# Patient Record
Sex: FEMALE | Race: WHITE | HISPANIC OR LATINO | Employment: OTHER | URBAN - METROPOLITAN AREA
[De-identification: names, ages, dates, MRNs, and addresses within clinical notes are randomized per-mention and may not be internally consistent; named-entity substitution may affect disease eponyms.]

---

## 2018-01-10 ENCOUNTER — OFFICE VISIT (OUTPATIENT)
Dept: MEDICAL GROUP | Age: 63
End: 2018-01-10
Payer: COMMERCIAL

## 2018-01-10 VITALS
OXYGEN SATURATION: 95 % | SYSTOLIC BLOOD PRESSURE: 110 MMHG | HEART RATE: 94 BPM | DIASTOLIC BLOOD PRESSURE: 60 MMHG | BODY MASS INDEX: 25.23 KG/M2 | WEIGHT: 157 LBS | TEMPERATURE: 98.2 F | HEIGHT: 66 IN

## 2018-01-10 DIAGNOSIS — E78.5 HYPERLIPIDEMIA, UNSPECIFIED HYPERLIPIDEMIA TYPE: ICD-10-CM

## 2018-01-10 DIAGNOSIS — I10 ESSENTIAL HYPERTENSION: ICD-10-CM

## 2018-01-10 DIAGNOSIS — E11.9 TYPE 2 DIABETES MELLITUS WITHOUT COMPLICATION, WITHOUT LONG-TERM CURRENT USE OF INSULIN (HCC): ICD-10-CM

## 2018-01-10 DIAGNOSIS — Z85.3 HX: BREAST CANCER: ICD-10-CM

## 2018-01-10 PROCEDURE — 99214 OFFICE O/P EST MOD 30 MIN: CPT | Performed by: FAMILY MEDICINE

## 2018-01-10 RX ORDER — CHLORAL HYDRATE 500 MG
1000 CAPSULE ORAL
COMMUNITY
End: 2020-07-05

## 2018-01-10 RX ORDER — LANCETS 30 GAUGE
EACH MISCELLANEOUS
Qty: 100 EACH | Refills: 3 | Status: CANCELLED | OUTPATIENT
Start: 2018-01-10

## 2018-01-10 RX ORDER — SIMVASTATIN 40 MG
40 TABLET ORAL
Qty: 135 TAB | Refills: 1 | Status: ON HOLD | OUTPATIENT
Start: 2018-01-10 | End: 2020-07-07

## 2018-01-10 RX ORDER — ENALAPRIL MALEATE 10 MG/1
10 TABLET ORAL DAILY
Qty: 90 TAB | Refills: 1 | Status: SHIPPED | OUTPATIENT
Start: 2018-01-10 | End: 2023-02-14 | Stop reason: SDUPTHER

## 2018-01-10 RX ORDER — M-VIT,TX,IRON,MINS/CALC/FOLIC 27MG-0.4MG
1 TABLET ORAL DAILY
COMMUNITY

## 2018-01-10 ASSESSMENT — PATIENT HEALTH QUESTIONNAIRE - PHQ9: CLINICAL INTERPRETATION OF PHQ2 SCORE: 0

## 2018-01-10 NOTE — PROGRESS NOTES
SUBJECTIVE:    Chief Complaint   Patient presents with   • Diabetes     Med refill       HPI:     Hilda Garcia is a 62 y.o. female with hx of breast cancer and diabetes mellitus type 2 here follow up and to re-establish care as she previously had a new provider due to insurance.   Diabetes- states she is overall up to date with her lab work. She has tolerated metformin 500 mg twice daily without significant issues.   She has been on asa therapy.   She will request prior labs.   She needs her medications refilled.     Hypertension- BP has been stable on enalapril 10 mg daily. Has tolerated well, no unusual cough symptoms.     Hyperlipidemia- stable on simvastatin 40 mg daily. No unusual muscle aches. Has tolerated medication well.     Hx of breast cancer, left mastectomy 2002. She has been overall stable.   Per patient breast imaging up to date, obtain records.       Diabetes Health Maintenance  On aspirin: 81 mg  Foot exam: per patient up to date. Obtain records.   Eye exam: per patient up to date. Obtain records.   Vaccinations:    Influenza:per patient, up to date, obtain records; Pneumonia: per patient, up to date, obtain records; Td/Adacel: 2015; Zostavax  2015  Educated about routine foot exam: recommend routine foot exams.   States her colonoscopy is up to date.         ROS:  Denies any recent fevers or chills. No nausea or vomiting. No diarrhea. No chest pains or shortness of breath. No lower extremity edema.    Current Outpatient Prescriptions on File Prior to Visit   Medication Sig Dispense Refill   • glucose blood (ACCU-CHEK SMARTVIEW) strip 1 Strip by Other route 1 time daily as needed. 50 Strip 3   • Lancets Misc Lancets order:AccuCheck ragini needles. Sig: use once daily and prn ssx high or low sugar. 90 Each 3   • aspirin EC (ECOTRIN) 81 MG TBEC Take 81 mg by mouth every day.       No current facility-administered medications on file prior to visit.        Allergies   Allergen Reactions   • Asa  "[Aspirin]      As child in [facility]- had bulging eyes. Tolerates asa 81 mg.        Patient Active Problem List    Diagnosis Date Noted   • Glenoid labrum tear 11/07/2011   • Tendonitis of shoulder 11/07/2011   • HX: breast cancer 10/03/2011   • Type II or unspecified type diabetes mellitus without mention of complication, not stated as uncontrolled 10/03/2011   • HTN (hypertension) 10/03/2011   • Hyperlipidemia 10/03/2011   • Preventative health care 10/03/2011       Past Medical History:   Diagnosis Date   • Asthma    • Breast cancer (CMS-HCC)     left   • Glenoid labrum tear 11/7/2011   • HTN (hypertension) 10/3/2011   • HX: breast cancer 10/3/2011    left, mastectomy   • Hyperlipidemia 10/3/2011   • Tendonitis of shoulder 11/7/2011   • Type II or unspecified type diabetes mellitus without mention of complication, not stated as uncontrolled 10/3/2011             OBJECTIVE:   /60   Pulse 94   Temp 36.8 °C (98.2 °F)   Ht 1.664 m (5' 5.5\")   Wt 71.2 kg (157 lb)   LMP 03/15/2005   SpO2 95%   BMI 25.73 kg/m²   General: Well-developed well-nourished female, no acute distress  Neck: supple, no lymphadenopathy- cervical or supraclavicular, no thyromegaly  Cardiovascular: regular rate and rhythm, no murmurs, gallops, rubs  Lungs: clear to auscultation bilaterally, no wheezes, crackles, or rhonchi  Abdomen: +bowel sounds, soft, nontender, nondistended, no rebound, no guarding, no hepatosplenomegaly  Extremities: no cyanosis, clubbing, edema  Skin: Warm and dry  Psych: appropriate mood and affect        ASSESSMENT/PLAN:    62 y.o.female with hx of left side breast cancer, diabetes mellitus type 2, hypertension and hyperlipidemia.       1. Type 2 diabetes mellitus without complication, without long-term current use of insulin (CMS-[facility]) - stable. Continue current medication. Monitor glucose. Diabetic diet, routine exercise.  metformin (GLUCOPHAGE) 500 MG Tab   2. Essential hypertension -controlled, continue " current medication.  enalapril (VASOTEC) 10 MG Tab   3. Hyperlipidemia, unspecified hyperlipidemia type - stable, continue current medication. Low cholesterol, high fiber diet.  simvastatin (ZOCOR) 40 MG Tab   4. HX: breast cancer - s/p left mastectomy.   Recommend routine imaging studies. Obtain prior records.           Return in about 3 months (around 4/10/2018).    This medical record contains text that has been entered with the assistance of computer voice recognition and dictation software.  Therefore, it may contain unintended errors in text, spelling, punctuation, or grammar.

## 2018-01-10 NOTE — LETTER
[facility]  Cherry Vaughn M.D.  [address]  [place] [address]  Fax: [phone]   Authorization for Release/Disclosure of   Protected Health Information   Name: HILDA PLEITEZ : 1955 SSN: [id number]   Address: [address] [place] [address]  [place] [address] Phone:    [phone] (home)    I authorize the entity listed below to release/disclose the PHI below to:   [facility]/Cherry Vaughn M.D. and Cherry Vaughn M.D.   Provider or Entity Name:  [facility]   Address   [facility]   Phone:      Fax:     Reason for request: continuity of care   Information to be released:    [  ] LAST COLONOSCOPY,  including any PATH REPORT and follow-up  [  ] LAST FIT/COLOGUARD RESULT [  ] LAST DEXA  [  ] LAST MAMMOGRAM  [  ] LAST PAP  [  ] LAST LABS [  ] RETINA EXAM REPORT  [  ] IMMUNIZATION RECORDS  [xxx  ] Release all info      [  ] Check here and initial the line next to each item to release ALL health information INCLUDING  _____ Care and treatment for drug and / or alcohol abuse  _____ HIV testing, infection status, or AIDS  _____ Genetic Testing    DATES OF SERVICE OR TIME PERIOD TO BE DISCLOSED: _____________  I understand and acknowledge that:  * This Authorization may be revoked at any time by you in writing, except if your health information has already been used or disclosed.  * Your health information that will be used or disclosed as a result of you signing this authorization could be re-disclosed by the recipient. If this occurs, your re-disclosed health information may no longer be protected by State or Federal laws.  * You may refuse to sign this Authorization. Your refusal will not affect your ability to obtain treatment.  * This Authorization becomes effective upon signing and will  on (date) __________.      If no date is indicated, this Authorization will  one (1) year from the signature date.    Name: Hilda Pleitez    Signature:   Date:     1/10/2018       PLEASE FAX  REQUESTED RECORDS BACK TO: [phone]

## 2018-01-10 NOTE — LETTER
[facility]  Cherry Vaughn M.D.  [address]  [place] [address]  Fax: [phone]   Authorization for Release/Disclosure of   Protected Health Information   Name: HILDA PLEITEZ : 1955 SSN: [id number]   Address: [address] [place] [address]  [place] [address] Phone:    [phone] (home)    I authorize the entity listed below to release/disclose the PHI below to:   [facility]/Cherry Vaughn M.D. and Cherry Vaughn M.D.   Provider or Entity Name:  Dr. Gaitan   Address   [facility]   Phone:      Fax:     Reason for request: continuity of care   Information to be released:    [  ] LAST COLONOSCOPY,  including any PATH REPORT and follow-up  [  ] LAST FIT/COLOGUARD RESULT [  ] LAST DEXA  [  ] LAST MAMMOGRAM  [  ] LAST PAP  [  ] LAST LABS [  xx] RETINA EXAM REPORT  [  ] IMMUNIZATION RECORDS  [  ] Release all info      [  ] Check here and initial the line next to each item to release ALL health information INCLUDING  _____ Care and treatment for drug and / or alcohol abuse  _____ HIV testing, infection status, or AIDS  _____ Genetic Testing    DATES OF SERVICE OR TIME PERIOD TO BE DISCLOSED: _____________  I understand and acknowledge that:  * This Authorization may be revoked at any time by you in writing, except if your health information has already been used or disclosed.  * Your health information that will be used or disclosed as a result of you signing this authorization could be re-disclosed by the recipient. If this occurs, your re-disclosed health information may no longer be protected by State or Federal laws.  * You may refuse to sign this Authorization. Your refusal will not affect your ability to obtain treatment.  * This Authorization becomes effective upon signing and will  on (date) __________.      If no date is indicated, this Authorization will  one (1) year from the signature date.    Name: Hilda Pleitez    Signature:   Date:     1/10/2018       PLEASE FAX REQUESTED  RECORDS BACK TO: [phone]

## 2018-07-31 DIAGNOSIS — E11.9 TYPE 2 DIABETES MELLITUS WITHOUT COMPLICATION, WITHOUT LONG-TERM CURRENT USE OF INSULIN (HCC): ICD-10-CM

## 2020-07-05 ENCOUNTER — HOSPITAL ENCOUNTER (OUTPATIENT)
Facility: MEDICAL CENTER | Age: 65
End: 2020-07-07
Attending: EMERGENCY MEDICINE | Admitting: HOSPITALIST
Payer: MEDICARE

## 2020-07-05 ENCOUNTER — APPOINTMENT (OUTPATIENT)
Dept: RADIOLOGY | Facility: MEDICAL CENTER | Age: 65
End: 2020-07-05
Attending: EMERGENCY MEDICINE
Payer: MEDICARE

## 2020-07-05 DIAGNOSIS — R07.9 CHEST PAIN, UNSPECIFIED TYPE: ICD-10-CM

## 2020-07-05 PROBLEM — E11.9 DIABETES MELLITUS TYPE 2 IN NONOBESE (HCC): Status: ACTIVE | Noted: 2020-07-05

## 2020-07-05 LAB
ALBUMIN SERPL BCP-MCNC: 4.4 G/DL (ref 3.2–4.9)
ALBUMIN/GLOB SERPL: 1.9 G/DL
ALP SERPL-CCNC: 66 U/L (ref 30–99)
ALT SERPL-CCNC: 15 U/L (ref 2–50)
ANION GAP SERPL CALC-SCNC: 11 MMOL/L (ref 7–16)
AST SERPL-CCNC: 20 U/L (ref 12–45)
BASOPHILS # BLD AUTO: 0.7 % (ref 0–1.8)
BASOPHILS # BLD: 0.03 K/UL (ref 0–0.12)
BILIRUB SERPL-MCNC: 0.4 MG/DL (ref 0.1–1.5)
BUN SERPL-MCNC: 12 MG/DL (ref 8–22)
CALCIUM SERPL-MCNC: 9 MG/DL (ref 8.4–10.2)
CHLORIDE SERPL-SCNC: 105 MMOL/L (ref 96–112)
CO2 SERPL-SCNC: 23 MMOL/L (ref 20–33)
CREAT SERPL-MCNC: 0.69 MG/DL (ref 0.5–1.4)
D DIMER PPP IA.FEU-MCNC: 0.6 UG/ML (FEU) (ref 0–0.5)
EKG IMPRESSION: NORMAL
EOSINOPHIL # BLD AUTO: 0.08 K/UL (ref 0–0.51)
EOSINOPHIL NFR BLD: 1.8 % (ref 0–6.9)
ERYTHROCYTE [DISTWIDTH] IN BLOOD BY AUTOMATED COUNT: 40.8 FL (ref 35.9–50)
GLOBULIN SER CALC-MCNC: 2.3 G/DL (ref 1.9–3.5)
GLUCOSE BLD-MCNC: 83 MG/DL (ref 65–99)
GLUCOSE SERPL-MCNC: 206 MG/DL (ref 65–99)
HCT VFR BLD AUTO: 45.2 % (ref 37–47)
HGB BLD-MCNC: 14.7 G/DL (ref 12–16)
IMM GRANULOCYTES # BLD AUTO: 0.01 K/UL (ref 0–0.11)
IMM GRANULOCYTES NFR BLD AUTO: 0.2 % (ref 0–0.9)
LYMPHOCYTES # BLD AUTO: 1.65 K/UL (ref 1–4.8)
LYMPHOCYTES NFR BLD: 37.9 % (ref 22–41)
MAGNESIUM SERPL-MCNC: 2.4 MG/DL (ref 1.5–2.5)
MCH RBC QN AUTO: 29.8 PG (ref 27–33)
MCHC RBC AUTO-ENTMCNC: 32.5 G/DL (ref 33.6–35)
MCV RBC AUTO: 91.7 FL (ref 81.4–97.8)
MONOCYTES # BLD AUTO: 0.28 K/UL (ref 0–0.85)
MONOCYTES NFR BLD AUTO: 6.4 % (ref 0–13.4)
NEUTROPHILS # BLD AUTO: 2.3 K/UL (ref 2–7.15)
NEUTROPHILS NFR BLD: 53 % (ref 44–72)
NRBC # BLD AUTO: 0 K/UL
NRBC BLD-RTO: 0 /100 WBC
PLATELET # BLD AUTO: 253 K/UL (ref 164–446)
PMV BLD AUTO: 8.6 FL (ref 9–12.9)
POTASSIUM SERPL-SCNC: 3.9 MMOL/L (ref 3.6–5.5)
PROT SERPL-MCNC: 6.7 G/DL (ref 6–8.2)
RBC # BLD AUTO: 4.93 M/UL (ref 4.2–5.4)
SODIUM SERPL-SCNC: 139 MMOL/L (ref 135–145)
TROPONIN T SERPL-MCNC: <6 NG/L (ref 6–19)
TROPONIN T SERPL-MCNC: <6 NG/L (ref 6–19)
TSH SERPL DL<=0.005 MIU/L-ACNC: 0.87 UIU/ML (ref 0.38–5.33)
WBC # BLD AUTO: 4.4 K/UL (ref 4.8–10.8)

## 2020-07-05 PROCEDURE — 94760 N-INVAS EAR/PLS OXIMETRY 1: CPT

## 2020-07-05 PROCEDURE — 83735 ASSAY OF MAGNESIUM: CPT

## 2020-07-05 PROCEDURE — 93005 ELECTROCARDIOGRAM TRACING: CPT

## 2020-07-05 PROCEDURE — 82962 GLUCOSE BLOOD TEST: CPT

## 2020-07-05 PROCEDURE — G0378 HOSPITAL OBSERVATION PER HR: HCPCS

## 2020-07-05 PROCEDURE — 99219 PR INITIAL OBSERVATION CARE,LEVL II: CPT | Mod: AI | Performed by: HOSPITALIST

## 2020-07-05 PROCEDURE — 93005 ELECTROCARDIOGRAM TRACING: CPT | Performed by: EMERGENCY MEDICINE

## 2020-07-05 PROCEDURE — A9270 NON-COVERED ITEM OR SERVICE: HCPCS | Performed by: HOSPITALIST

## 2020-07-05 PROCEDURE — 93017 CV STRESS TEST TRACING ONLY: CPT | Performed by: HOSPITALIST

## 2020-07-05 PROCEDURE — 99285 EMERGENCY DEPT VISIT HI MDM: CPT

## 2020-07-05 PROCEDURE — 700102 HCHG RX REV CODE 250 W/ 637 OVERRIDE(OP): Performed by: EMERGENCY MEDICINE

## 2020-07-05 PROCEDURE — 80053 COMPREHEN METABOLIC PANEL: CPT

## 2020-07-05 PROCEDURE — 84484 ASSAY OF TROPONIN QUANT: CPT

## 2020-07-05 PROCEDURE — 93005 ELECTROCARDIOGRAM TRACING: CPT | Mod: XE,77 | Performed by: HOSPITALIST

## 2020-07-05 PROCEDURE — 84443 ASSAY THYROID STIM HORMONE: CPT

## 2020-07-05 PROCEDURE — 83036 HEMOGLOBIN GLYCOSYLATED A1C: CPT

## 2020-07-05 PROCEDURE — 700102 HCHG RX REV CODE 250 W/ 637 OVERRIDE(OP): Performed by: HOSPITALIST

## 2020-07-05 PROCEDURE — 85025 COMPLETE CBC W/AUTO DIFF WBC: CPT

## 2020-07-05 PROCEDURE — 93010 ELECTROCARDIOGRAM REPORT: CPT | Performed by: INTERNAL MEDICINE

## 2020-07-05 PROCEDURE — 36415 COLL VENOUS BLD VENIPUNCTURE: CPT

## 2020-07-05 PROCEDURE — 85379 FIBRIN DEGRADATION QUANT: CPT

## 2020-07-05 PROCEDURE — A9270 NON-COVERED ITEM OR SERVICE: HCPCS | Performed by: EMERGENCY MEDICINE

## 2020-07-05 PROCEDURE — 71045 X-RAY EXAM CHEST 1 VIEW: CPT

## 2020-07-05 RX ORDER — UBIDECARENONE 75 MG
100 CAPSULE ORAL DAILY
Status: DISCONTINUED | OUTPATIENT
Start: 2020-07-06 | End: 2020-07-07 | Stop reason: HOSPADM

## 2020-07-05 RX ORDER — ACETAMINOPHEN 325 MG/1
650 TABLET ORAL EVERY 6 HOURS PRN
Status: DISCONTINUED | OUTPATIENT
Start: 2020-07-05 | End: 2020-07-07 | Stop reason: HOSPADM

## 2020-07-05 RX ORDER — ONDANSETRON 4 MG/1
4 TABLET, ORALLY DISINTEGRATING ORAL EVERY 4 HOURS PRN
Status: DISCONTINUED | OUTPATIENT
Start: 2020-07-05 | End: 2020-07-07 | Stop reason: HOSPADM

## 2020-07-05 RX ORDER — DEXTROSE MONOHYDRATE 25 G/50ML
50 INJECTION, SOLUTION INTRAVENOUS
Status: DISCONTINUED | OUTPATIENT
Start: 2020-07-05 | End: 2020-07-07 | Stop reason: HOSPADM

## 2020-07-05 RX ORDER — M-VIT,TX,IRON,MINS/CALC/FOLIC 27MG-0.4MG
1 TABLET ORAL DAILY
Status: DISCONTINUED | OUTPATIENT
Start: 2020-07-06 | End: 2020-07-07 | Stop reason: HOSPADM

## 2020-07-05 RX ORDER — POLYETHYLENE GLYCOL 3350 17 G/17G
1 POWDER, FOR SOLUTION ORAL
Status: DISCONTINUED | OUTPATIENT
Start: 2020-07-05 | End: 2020-07-07 | Stop reason: HOSPADM

## 2020-07-05 RX ORDER — ASPIRIN 81 MG/1
243 TABLET, CHEWABLE ORAL ONCE
Status: COMPLETED | OUTPATIENT
Start: 2020-07-05 | End: 2020-07-05

## 2020-07-05 RX ORDER — ASPIRIN 81 MG/1
162 TABLET, CHEWABLE ORAL DAILY
Status: DISCONTINUED | OUTPATIENT
Start: 2020-07-06 | End: 2020-07-06

## 2020-07-05 RX ORDER — ONDANSETRON 2 MG/ML
4 INJECTION INTRAMUSCULAR; INTRAVENOUS EVERY 4 HOURS PRN
Status: DISCONTINUED | OUTPATIENT
Start: 2020-07-05 | End: 2020-07-07 | Stop reason: HOSPADM

## 2020-07-05 RX ORDER — AMOXICILLIN 250 MG
2 CAPSULE ORAL 2 TIMES DAILY
Status: DISCONTINUED | OUTPATIENT
Start: 2020-07-05 | End: 2020-07-07 | Stop reason: HOSPADM

## 2020-07-05 RX ORDER — ENALAPRIL MALEATE 5 MG/1
10 TABLET ORAL DAILY
Status: DISCONTINUED | OUTPATIENT
Start: 2020-07-06 | End: 2020-07-07 | Stop reason: HOSPADM

## 2020-07-05 RX ORDER — ASPIRIN 600 MG/1
300 SUPPOSITORY RECTAL DAILY
Status: DISCONTINUED | OUTPATIENT
Start: 2020-07-05 | End: 2020-07-05

## 2020-07-05 RX ORDER — SIMVASTATIN 20 MG
40 TABLET ORAL
Status: DISCONTINUED | OUTPATIENT
Start: 2020-07-05 | End: 2020-07-06

## 2020-07-05 RX ORDER — BISACODYL 10 MG
10 SUPPOSITORY, RECTAL RECTAL
Status: DISCONTINUED | OUTPATIENT
Start: 2020-07-05 | End: 2020-07-07 | Stop reason: HOSPADM

## 2020-07-05 RX ADMIN — ASPIRIN 81 MG CHEWABLE TABLET 243 MG: 81 TABLET CHEWABLE at 09:53

## 2020-07-05 RX ADMIN — SIMVASTATIN 40 MG: 20 TABLET, FILM COATED ORAL at 20:00

## 2020-07-05 ASSESSMENT — COGNITIVE AND FUNCTIONAL STATUS - GENERAL
SUGGESTED CMS G CODE MODIFIER MOBILITY: CH
MOBILITY SCORE: 24
DAILY ACTIVITIY SCORE: 24
SUGGESTED CMS G CODE MODIFIER DAILY ACTIVITY: CH

## 2020-07-05 ASSESSMENT — ENCOUNTER SYMPTOMS
PALPITATIONS: 0
RESPIRATORY NEGATIVE: 1
LOSS OF CONSCIOUSNESS: 0
NAUSEA: 0
HEMOPTYSIS: 0
DOUBLE VISION: 0
VOMITING: 0
NERVOUS/ANXIOUS: 0
ABDOMINAL PAIN: 0
PSYCHIATRIC NEGATIVE: 1
FOCAL WEAKNESS: 0
EYES NEGATIVE: 1
GASTROINTESTINAL NEGATIVE: 1
SEIZURES: 0
MUSCULOSKELETAL NEGATIVE: 1
CONSTITUTIONAL NEGATIVE: 1
CONSTIPATION: 0
NEUROLOGICAL NEGATIVE: 1
DIAPHORESIS: 0
FEVER: 0
CHILLS: 0
DIZZINESS: 0
WHEEZING: 0
HEADACHES: 0
BRUISES/BLEEDS EASILY: 0
DIARRHEA: 0
COUGH: 0
HEARTBURN: 0
BLOOD IN STOOL: 0

## 2020-07-05 ASSESSMENT — COPD QUESTIONNAIRES
HAVE YOU SMOKED AT LEAST 100 CIGARETTES IN YOUR ENTIRE LIFE: NO/DON'T KNOW
DO YOU EVER COUGH UP ANY MUCUS OR PHLEGM?: NO/ONLY WITH OCCASIONAL COLDS OR INFECTIONS
DURING THE PAST 4 WEEKS HOW MUCH DID YOU FEEL SHORT OF BREATH: NONE/LITTLE OF THE TIME
COPD SCREENING SCORE: 2

## 2020-07-05 ASSESSMENT — LIFESTYLE VARIABLES
CONSUMPTION TOTAL: NEGATIVE
HOW MANY TIMES IN THE PAST YEAR HAVE YOU HAD 5 OR MORE DRINKS IN A DAY: 0
TOTAL SCORE: 0
HAVE YOU EVER FELT YOU SHOULD CUT DOWN ON YOUR DRINKING: NO
ALCOHOL_USE: NO
EVER_SMOKED: NEVER
EVER HAD A DRINK FIRST THING IN THE MORNING TO STEADY YOUR NERVES TO GET RID OF A HANGOVER: NO
HAVE PEOPLE ANNOYED YOU BY CRITICIZING YOUR DRINKING: NO
AVERAGE NUMBER OF DAYS PER WEEK YOU HAVE A DRINK CONTAINING ALCOHOL: 0
ON A TYPICAL DAY WHEN YOU DRINK ALCOHOL HOW MANY DRINKS DO YOU HAVE: 0
TOTAL SCORE: 0
TOTAL SCORE: 0
EVER FELT BAD OR GUILTY ABOUT YOUR DRINKING: NO

## 2020-07-05 ASSESSMENT — FIBROSIS 4 INDEX: FIB4 SCORE: 1.33

## 2020-07-05 ASSESSMENT — PATIENT HEALTH QUESTIONNAIRE - PHQ9
1. LITTLE INTEREST OR PLEASURE IN DOING THINGS: NOT AT ALL
2. FEELING DOWN, DEPRESSED, IRRITABLE, OR HOPELESS: NOT AT ALL
SUM OF ALL RESPONSES TO PHQ9 QUESTIONS 1 AND 2: 0

## 2020-07-05 NOTE — H&P
Hospital Medicine History & Physical Note    Date of Service  7/5/2020    Primary Care Physician  Pcp Pt States None    Code Status  Full Code    Chief Complaint  Chief Complaint   Patient presents with   • Chest Pain       History of Presenting Illness  65 y.o. female who presented 7/5/2020 with sudden onset of chest pain that came on about 8:00 this morning.  The patient was doing her online Scientologist.  The patient's Scientologist group then started singing and dancing so she jumped up and started singing and dancing.  Afterwards she developed chest pain that was is 8 out of 10 intensity was sharp and stabbing.  No associated nausea or vomiting no shortness of breath no diaphoresis.  This is the first time she is ever had a chest pain.  She has no family history of chest pain like this.  The patient at this point will be placed on aspirin admitted to the telemetry unit under telemetric monitoring will run serial cardiac markers followed by a exercise treadmill stress test.    Review of Systems  Review of Systems   Constitutional: Negative.  Negative for chills, diaphoresis and fever.   HENT: Negative.    Eyes: Negative.  Negative for double vision.   Respiratory: Negative.  Negative for cough, hemoptysis and wheezing.    Cardiovascular: Positive for chest pain. Negative for palpitations and leg swelling.   Gastrointestinal: Negative.  Negative for abdominal pain, blood in stool, constipation, diarrhea, heartburn, nausea and vomiting.   Genitourinary: Negative.  Negative for frequency, hematuria and urgency.   Musculoskeletal: Negative.  Negative for joint pain.   Skin: Negative.  Negative for itching and rash.   Neurological: Negative.  Negative for dizziness, focal weakness, seizures, loss of consciousness and headaches.   Endo/Heme/Allergies: Negative.  Does not bruise/bleed easily.   Psychiatric/Behavioral: Negative.  Negative for suicidal ideas. The patient is not nervous/anxious.    All other systems reviewed and are  negative.      Past Medical History   has a past medical history of Asthma, Breast cancer (CMS-HCC) (HCC), Glenoid labrum tear (11/7/2011), HTN (hypertension) (10/3/2011), breast cancer (10/3/2011), Hyperlipidemia (10/3/2011), Tendonitis of shoulder (11/7/2011), and Type II or unspecified type diabetes mellitus without mention of complication, not stated as uncontrolled (10/3/2011).    Surgical History   has a past surgical history that includes mastectomy modified radical; breast reconstruction; breast implant removal; dilation and curettage; and mastectomy (2002).     Family History  family history includes Arthritis in her daughter; Cancer in her father, maternal aunt, and mother; Lung Disease in her father; Stroke in her mother.     Social History   reports that she has never smoked. She has never used smokeless tobacco. She reports that she does not drink alcohol or use drugs.    Allergies  Allergies   Allergen Reactions   • Asa [Aspirin]      As child in [facility]- had bulging eyes. Tolerates asa 81 mg.        Medications  Prior to Admission Medications   Prescriptions Last Dose Informant Patient Reported? Taking?   Calcium Carb-Cholecalciferol (CALCIUM 600+D3 PO) 7/5/2020 at am  Yes No   Sig: Take 1 Tab by mouth every morning.   Cyanocobalamin (VITAMIN B-12 PO) 7/5/2020 at am  Yes Yes   Sig: Take 1 Tab by mouth every day.   aspirin EC (ECOTRIN) 81 MG TBEC 7/5/2020 at am  Yes No   Sig: Take 81 mg by mouth every bedtime.   enalapril (VASOTEC) 10 MG Tab 7/5/2020 at am  No No   Sig: Take 1 Tab by mouth every day.   simvastatin (ZOCOR) 40 MG Tab 7/4/2020 at hs  No No   Sig: Take 1 Tab by mouth every bedtime.   therapeutic multivitamin-minerals (THERAGRAN-M) Tab 7/5/2020 at am  Yes No   Sig: Take 1 Tab by mouth every day.      Facility-Administered Medications: None       Physical Exam  Temp:  [37 °C (98.6 °F)] 37 °C (98.6 °F)  Pulse:  [55-66] 57  Resp:  [18-19] 19  BP: (142-150)/(63-67) 144/63  SpO2:  [95 %-99  %] 97 %    Physical Exam  Vitals signs and nursing note reviewed. Exam conducted with a chaperone present.   Constitutional:       Appearance: Normal appearance. She is well-developed.   HENT:      Head: Normocephalic and atraumatic.      Right Ear: External ear normal.      Left Ear: External ear normal.      Nose: Nose normal.      Mouth/Throat:      Mouth: Mucous membranes are moist.      Pharynx: Oropharynx is clear.   Eyes:      Extraocular Movements: Extraocular movements intact.      Conjunctiva/sclera: Conjunctivae normal.      Pupils: Pupils are equal, round, and reactive to light.   Neck:      Musculoskeletal: Normal range of motion and neck supple.      Thyroid: No thyromegaly.      Vascular: No JVD.   Cardiovascular:      Rate and Rhythm: Normal rate and regular rhythm.      Pulses: Normal pulses.      Heart sounds: Normal heart sounds.   Pulmonary:      Effort: Pulmonary effort is normal.      Breath sounds: Normal breath sounds.   Chest:      Chest wall: No tenderness.   Abdominal:      General: Abdomen is flat. Bowel sounds are normal. There is no distension.      Palpations: Abdomen is soft. There is no mass.      Tenderness: There is no abdominal tenderness. There is no guarding or rebound.   Musculoskeletal: Normal range of motion.   Lymphadenopathy:      Cervical: No cervical adenopathy.   Skin:     General: Skin is warm and dry.      Capillary Refill: Capillary refill takes 2 to 3 seconds.      Findings: No rash.   Neurological:      General: No focal deficit present.      Mental Status: She is alert and oriented to person, place, and time. Mental status is at baseline.      Cranial Nerves: No cranial nerve deficit.      Deep Tendon Reflexes: Reflexes are normal and symmetric.   Psychiatric:         Mood and Affect: Mood normal.         Behavior: Behavior normal.         Thought Content: Thought content normal.         Judgment: Judgment normal.         Laboratory:  Recent Labs     07/05/20  0938    WBC 4.4*   RBC 4.93   HEMOGLOBIN 14.7   HEMATOCRIT 45.2   MCV 91.7   MCH 29.8   MCHC 32.5*   RDW 40.8   PLATELETCT 253   MPV 8.6*     Recent Labs     07/05/20  0938   SODIUM 139   POTASSIUM 3.9   CHLORIDE 105   CO2 23   GLUCOSE 206*   BUN 12   CREATININE 0.69   CALCIUM 9.0     Recent Labs     07/05/20  0938   ALTSGPT 15   ASTSGOT 20   ALKPHOSPHAT 66   TBILIRUBIN 0.4   GLUCOSE 206*         No results for input(s): NTPROBNP in the last 72 hours.      Recent Labs     07/05/20  0938   TROPONINT <6       Imaging:  DX-CHEST-PORTABLE (1 VIEW)   Final Result      No acute cardiac or pulmonary abnormality is noted.      Cardiac Stress Test Treadmill without Images    (Results Pending)         Assessment/Plan:    * Chest pain  Assessment & Plan  Patient has developed chest pain this morning while she was doing some dancing and singing through her Dweho.  The patient's chest pain was left-sided and was the entire left side it was sharp like somebody was stabbing her with a knife.  No associated nausea or vomiting no diaphoresis no shortness of breath.  The patient does have a history of diabetes and with her history of hypertension as well the patient at this point will be admitted to the telemetric unit.  Should be evaluated by serial cardiac markers and then we will do a exercise stress test on her.    Diabetes mellitus type 2 in nonobese (HCC)  Assessment & Plan  Diabetes mellitus type 2 that at this point is diet controlled.  -accus with sliding scale coverage while in the hospital  -diabetic diet continued  -diabetic education  -follow glycohemoglobin levels long term, check hemoglobin A1c level  -monitor for hypoglycemic episodes and adjust control if he should get low    HTN (hypertension)- (present on admission)  Assessment & Plan  Optimize hypertension management to keep systolic blood pressure less than 140 diastolic under 90.    HX: breast cancer- (present on admission)  Assessment & Plan  History of breast  cancer status post left mastectomy 9/11/2011.  Patient is currently not on tamoxifen as did not have receptors.  Patient did not need any radiation or chemo.  Patient is in remission with her breast cancer.

## 2020-07-05 NOTE — ASSESSMENT & PLAN NOTE
-Currently diet controlled.  Has been off metformin for approximately 2-3 years.  -Continue diabetic diet.  -A1c in process.  -Accu-Cheks ACHS with SSI as required while inpatient.

## 2020-07-05 NOTE — ASSESSMENT & PLAN NOTE
-Positive treadmill stress test.  Cardiology consulted.  Stress echocardiogram ordered.  -Start ASA and switch to atorvastatin.  -Troponins negative.  -Chest x-ray unremarkable.  -Age-adjusted d-dimer normal.

## 2020-07-05 NOTE — ED NOTES
Iv placed , labs drawn and taken to lab. Pt medicated as directed by ELYSE valera, poc update given to pt. Further orders and dispo pending at this time. No further questions from pt at this time

## 2020-07-05 NOTE — ED PROVIDER NOTES
ED Provider Note    CHIEF COMPLAINT  Chief Complaint   Patient presents with   • Chest Pain       HPI  Hilda Garcia is a 65 y.o. female who presents with chest pain atypical it occurred about an hour prior to presentation left-sided chest she also had radiation to the upper arm or into the jaw.  She is never had like this before.  It is atypical and sharp like an intermittent in the chest.  Aching in the jaw and arm.  She did notice the aching in the jaw prior day.  She also feels like it is clicks when she opens her mouth.  She has no shortness of breath no nausea no vomiting no diaphoresis no cough no sputum production no abdominal pain.  No numbness tingling or weakness.  Comes in for evaluation all other systems are negative    REVIEW OF SYSTEMS  See HPI for further details    PAST MEDICAL HISTORY  Past Medical History:   Diagnosis Date   • Asthma    • Breast cancer (CMS-HCC) (HCC)     left   • Glenoid labrum tear 11/7/2011   • HTN (hypertension) 10/3/2011   • HX: breast cancer 10/3/2011    left, mastectomy   • Hyperlipidemia 10/3/2011   • Tendonitis of shoulder 11/7/2011   • Type II or unspecified type diabetes mellitus without mention of complication, not stated as uncontrolled 10/3/2011       FAMILY HISTORY  Family History   Problem Relation Age of Onset   • Cancer Mother         pancreatic   • Stroke Mother    • Cancer Father         prostate cancer   • Lung Disease Father         emphysema   • Arthritis Daughter         Ank. Spond   • Cancer Maternal Aunt         breast cancer       SOCIAL HISTORY  Social History     Socioeconomic History   • Marital status:      Spouse name: Not on file   • Number of children: Not on file   • Years of education: Not on file   • Highest education level: Not on file   Occupational History   • Occupation: management in past     Employer: OTHER   Social Needs   • Financial resource strain: Not on file   • Food insecurity     Worry: Not on file     Inability: Not on  file   • Transportation needs     Medical: Not on file     Non-medical: Not on file   Tobacco Use   • Smoking status: Never Smoker   • Smokeless tobacco: Never Used   Substance and Sexual Activity   • Alcohol use: No     Comment: Rare   • Drug use: No   • Sexual activity: Yes     Partners: Male   Lifestyle   • Physical activity     Days per week: Not on file     Minutes per session: Not on file   • Stress: Not on file   Relationships   • Social connections     Talks on phone: Not on file     Gets together: Not on file     Attends [employer] service: Not on file     Active member of club or organization: Not on file     Attends meetings of clubs or organizations: Not on file     Relationship status: Not on file   • Intimate partner violence     Fear of current or ex partner: Not on file     Emotionally abused: Not on file     Physically abused: Not on file     Forced sexual activity: Not on file   Other Topics Concern   • Not on file   Social History Narrative     30+ years.     Born [facility].    Brother doctor in [place].     Sister.     Son is internist in [place].        SURGICAL HISTORY  Past Surgical History:   Procedure Laterality Date   • MASTECTOMY  2002   • BREAST IMPLANT REMOVAL     • BREAST RECONSTRUCTION     • DILATION AND CURETTAGE     • MASTECTOMY MODIFIED RADICAL      left       CURRENT MEDICATIONS  Home Medications     Reviewed by Marylou Pandey (Pharmacy Tech) on 07/05/20 at 1011  Med List Status: Complete   Medication Last Dose Status   aspirin EC (ECOTRIN) 81 MG TBEC 7/5/2020 Active   Calcium Carb-Cholecalciferol (CALCIUM 600+D3 PO) 7/5/2020 Active   Cyanocobalamin (VITAMIN B-12 PO) 7/5/2020 Active   enalapril (VASOTEC) 10 MG Tab 7/5/2020 Active   simvastatin (ZOCOR) 40 MG Tab 7/4/2020 Active   therapeutic multivitamin-minerals (THERAGRAN-M) Tab 7/5/2020 Active                ALLERGIES  Allergies   Allergen Reactions   • Asa [Aspirin]      As child in [facility]- had bulging eyes.  Tolerates asa 81 mg.        PHYSICAL EXAM  VITAL SIGNS: /67   Pulse 66   Temp 37 °C (98.6 °F)   Resp 18   Wt 66 kg (145 lb 8.1 oz)   LMP 03/15/2005   SpO2 99%   BMI 23.84 kg/m²     Constitutional: Patient is alert and oriented x3 in no distress   HENT: Moist mucous membranes  Eyes:   No conjunctivitis or icterus  Neck: Trachea is midline with  Lymphatic: No anterior cervical lymphadenopathy  Cardiovascular: Normal heart rate    Thorax & Lungs: Clear to auscultation  Back: No CVA tenderness  Abdomen: Nontender  Neurologic: Normal motor sensation  Extremities: No edema  Psychiatric: Affect normal, Judgment normal, Mood normal.     Results for orders placed or performed during the hospital encounter of 07/05/20   CBC WITH DIFFERENTIAL   Result Value Ref Range    WBC 4.4 (L) 4.8 - 10.8 K/uL    RBC 4.93 4.20 - 5.40 M/uL    Hemoglobin 14.7 12.0 - 16.0 g/dL    Hematocrit 45.2 37.0 - 47.0 %    MCV 91.7 81.4 - 97.8 fL    MCH 29.8 27.0 - 33.0 pg    MCHC 32.5 (L) 33.6 - 35.0 g/dL    RDW 40.8 35.9 - 50.0 fL    Platelet Count 253 164 - 446 K/uL    MPV 8.6 (L) 9.0 - 12.9 fL    Neutrophils-Polys 53.00 44.00 - 72.00 %    Lymphocytes 37.90 22.00 - 41.00 %    Monocytes 6.40 0.00 - 13.40 %    Eosinophils 1.80 0.00 - 6.90 %    Basophils 0.70 0.00 - 1.80 %    Immature Granulocytes 0.20 0.00 - 0.90 %    Nucleated RBC 0.00 /100 WBC    Neutrophils (Absolute) 2.30 2.00 - 7.15 K/uL    Lymphs (Absolute) 1.65 1.00 - 4.80 K/uL    Monos (Absolute) 0.28 0.00 - 0.85 K/uL    Eos (Absolute) 0.08 0.00 - 0.51 K/uL    Baso (Absolute) 0.03 0.00 - 0.12 K/uL    Immature Granulocytes (abs) 0.01 0.00 - 0.11 K/uL    NRBC (Absolute) 0.00 K/uL   COMP METABOLIC PANEL   Result Value Ref Range    Sodium 139 135 - 145 mmol/L    Potassium 3.9 3.6 - 5.5 mmol/L    Chloride 105 96 - 112 mmol/L    Co2 23 20 - 33 mmol/L    Anion Gap 11.0 7.0 - 16.0    Glucose 206 (H) 65 - 99 mg/dL    Bun 12 8 - 22 mg/dL    Creatinine 0.69 0.50 - 1.40 mg/dL    Calcium 9.0  8.4 - 10.2 mg/dL    AST(SGOT) 20 12 - 45 U/L    ALT(SGPT) 15 2 - 50 U/L    Alkaline Phosphatase 66 30 - 99 U/L    Total Bilirubin 0.4 0.1 - 1.5 mg/dL    Albumin 4.4 3.2 - 4.9 g/dL    Total Protein 6.7 6.0 - 8.2 g/dL    Globulin 2.3 1.9 - 3.5 g/dL    A-G Ratio 1.9 g/dL   TROPONIN   Result Value Ref Range    Troponin T <6 6 - 19 ng/L   ESTIMATED GFR   Result Value Ref Range    GFR If African American >60 >60 mL/min/1.73 m 2    GFR If Non African American >60 >60 mL/min/1.73 m 2   EKG   Result Value Ref Range    Report       [facility] Emergency Dept.    Test Date:  2020  Pt Name:    NELY PLEITEZ             Department: [facility]  MRN:        [id number]                      [facility]  Gender:     Female                       Technician: [id number]  :        1955                   Requested By:ER TRIAGE PROTOCOL  Order #:    [id number]                    Reading MD: JANIYA MUNIZ MD    Measurements  Intervals                                Axis  Rate:       65                           P:          69  TX:         183                          QRS:        47  QRSD:       94                           T:          39  QT:         404  QTc:        421    Interpretive Statements  Normal sinus rhythm with a rate of 65 normal corrected QT interval QRS and  axis.  Nonspecific T wave changes only are noted.  No old EKG for comparison  Electronically Signed On 2020 10:32:50 PDT by JANIYA MUNIZ MD       DX-CHEST-PORTABLE (1 VIEW)   Final Result      No acute cardiac or pulmonary abnormality is noted.              COURSE & MEDICAL DECISION MAKING  Pertinent Labs & Imaging studies reviewed. (See chart for details)  Patient's troponin is normal but she is early in her pain.  Is concerning for possible cardiac ischemia.  She has the faintest T wave changes in anterior V2 V4.  We did give her 3 chewable aspirins to get her up to a total of 325 today.  Due to all these factors the  patient is moderate risk for ischemic cardiac disease.  I will be contacting the hospitalist for admission    Troponin is negative d-dimer comes back at 0.6 but it age-adjusted d-dimer is 0.65 in our measurement she does not need a PE study this time.  I did inform her son who is an internist at [facility] of the patient's situation.  Contact the hospitalist for admission    FINAL IMPRESSION  1.   1. Chest pain, unspecified type         2.   3.         Electronically signed by: Kennedy Bryant M.D., 7/5/2020 10:24 AM

## 2020-07-05 NOTE — ASSESSMENT & PLAN NOTE
-History of breast cancer status post left mastectomy 9/11/2011.  -Not on tamoxifen, as did not have receptors.  -Did not need any radiation or chemo.

## 2020-07-06 ENCOUNTER — APPOINTMENT (OUTPATIENT)
Dept: RADIOLOGY | Facility: MEDICAL CENTER | Age: 65
End: 2020-07-06
Attending: HOSPITALIST
Payer: MEDICARE

## 2020-07-06 LAB
CHOLEST SERPL-MCNC: 225 MG/DL (ref 100–199)
COVID ORDER STATUS COVID19: NORMAL
EKG IMPRESSION: NORMAL
EST. AVERAGE GLUCOSE BLD GHB EST-MCNC: 128 MG/DL
GLUCOSE BLD-MCNC: 102 MG/DL (ref 65–99)
GLUCOSE BLD-MCNC: 113 MG/DL (ref 65–99)
GLUCOSE BLD-MCNC: 99 MG/DL (ref 65–99)
HBA1C MFR BLD: 6.1 % (ref 0–5.6)
HDLC SERPL-MCNC: 73 MG/DL
LDLC SERPL CALC-MCNC: 124 MG/DL
TRIGL SERPL-MCNC: 140 MG/DL (ref 0–149)
TROPONIN T SERPL-MCNC: 9 NG/L (ref 6–19)

## 2020-07-06 PROCEDURE — A9270 NON-COVERED ITEM OR SERVICE: HCPCS | Performed by: INTERNAL MEDICINE

## 2020-07-06 PROCEDURE — 84484 ASSAY OF TROPONIN QUANT: CPT

## 2020-07-06 PROCEDURE — 80061 LIPID PANEL: CPT

## 2020-07-06 PROCEDURE — 99225 PR SUBSEQUENT OBSERVATION CARE,LEVEL II: CPT | Performed by: NURSE PRACTITIONER

## 2020-07-06 PROCEDURE — 82962 GLUCOSE BLOOD TEST: CPT

## 2020-07-06 PROCEDURE — A9270 NON-COVERED ITEM OR SERVICE: HCPCS | Performed by: HOSPITALIST

## 2020-07-06 PROCEDURE — 99205 OFFICE O/P NEW HI 60 MIN: CPT | Performed by: INTERNAL MEDICINE

## 2020-07-06 PROCEDURE — 93018 CV STRESS TEST I&R ONLY: CPT | Performed by: INTERNAL MEDICINE

## 2020-07-06 PROCEDURE — 700102 HCHG RX REV CODE 250 W/ 637 OVERRIDE(OP): Performed by: INTERNAL MEDICINE

## 2020-07-06 PROCEDURE — G0378 HOSPITAL OBSERVATION PER HR: HCPCS

## 2020-07-06 PROCEDURE — A9270 NON-COVERED ITEM OR SERVICE: HCPCS | Performed by: NURSE PRACTITIONER

## 2020-07-06 PROCEDURE — 700102 HCHG RX REV CODE 250 W/ 637 OVERRIDE(OP): Performed by: NURSE PRACTITIONER

## 2020-07-06 PROCEDURE — U0003 INFECTIOUS AGENT DETECTION BY NUCLEIC ACID (DNA OR RNA); SEVERE ACUTE RESPIRATORY SYNDROME CORONAVIRUS 2 (SARS-COV-2) (CORONAVIRUS DISEASE [COVID-19]), AMPLIFIED PROBE TECHNIQUE, MAKING USE OF HIGH THROUGHPUT TECHNOLOGIES AS DESCRIBED BY CMS-2020-01-R: HCPCS

## 2020-07-06 PROCEDURE — C9803 HOPD COVID-19 SPEC COLLECT: HCPCS | Performed by: NURSE PRACTITIONER

## 2020-07-06 PROCEDURE — 700102 HCHG RX REV CODE 250 W/ 637 OVERRIDE(OP): Performed by: HOSPITALIST

## 2020-07-06 RX ORDER — ATORVASTATIN CALCIUM 40 MG/1
40 TABLET, FILM COATED ORAL EVERY EVENING
Status: DISCONTINUED | OUTPATIENT
Start: 2020-07-06 | End: 2020-07-07 | Stop reason: HOSPADM

## 2020-07-06 RX ADMIN — ASPIRIN 162 MG: 81 TABLET, COATED ORAL at 05:34

## 2020-07-06 RX ADMIN — ENALAPRIL MALEATE 10 MG: 5 TABLET ORAL at 05:34

## 2020-07-06 RX ADMIN — ATORVASTATIN CALCIUM 40 MG: 40 TABLET, FILM COATED ORAL at 17:18

## 2020-07-06 RX ADMIN — VITAM B12 100 MCG: 100 TAB at 05:34

## 2020-07-06 RX ADMIN — MULTIPLE VITAMINS W/ MINERALS TAB 1 TABLET: TAB at 05:34

## 2020-07-06 RX ADMIN — SENNOSIDES-DOCUSATE SODIUM TAB 8.6-50 MG 2 TABLET: 8.6-5 TAB at 17:14

## 2020-07-06 RX ADMIN — ASPIRIN 81 MG: 81 TABLET, COATED ORAL at 18:08

## 2020-07-06 ASSESSMENT — ENCOUNTER SYMPTOMS
CONSTIPATION: 0
DIARRHEA: 0
SENSORY CHANGE: 0
FEVER: 0
DIZZINESS: 0
HEADACHES: 0
INSOMNIA: 0
PALPITATIONS: 0
WEAKNESS: 0
NAUSEA: 0
SPEECH CHANGE: 0
COUGH: 0
FOCAL WEAKNESS: 0
DIAPHORESIS: 0
SHORTNESS OF BREATH: 0
ORTHOPNEA: 0
DEPRESSION: 0
VOMITING: 0
PND: 0
ABDOMINAL PAIN: 0
WHEEZING: 0
NERVOUS/ANXIOUS: 0
BRUISES/BLEEDS EASILY: 0

## 2020-07-06 NOTE — PROGRESS NOTES
[facility] Medicine Daily Progress Note    Date of Service  7/6/2020    Chief Complaint  Chest pain    Hospital Course   Ms. Garcia is a 65-year-old female who presented to the emergency department on 7/5/2020 with a sudden onset of chest pain that came on at approximately 8:00 that morning while she was at [employer] as she was singing, dancing, and jumping up and down.  She had no associated nausea or vomiting, shortness of breath, or diaphoresis.  She does have risk factors for CVD, including diabetes, hypertension, and hyperlipidemia.  Her EKG on arrival had no evidence of acute ischemia or infarct, troponins were negative x3, age-adjusted d-dimer was negative, and blood sugars were noted to be controlled.  Her lipid profile did show an elevated total cholesterol of 225 with an elevated LDL of 124.  She was started on aspirin therapy and admitted to the hospital for further testing.  A treadmill stress test was performed and was positive.  Dr. Mccann of cardiology was contacted.  While there is a high incidence of false positive treadmill stress testing in women, given her comorbidities Dr. Mccann would like to perform a stress echocardiogram, which has been ordered.       Interval Problem Update  Feeling good today, no further chest pain. No N/V, diaphoresis, or SOB.     MPI positive, cards consulted, stress echo ordered.    CBC mildly low at 4.4.  CBC otherwise unremarkable.  BMP also unremarkable.  Troponins negative x3.  Age-adjusted d-dimer is normal.  Blood profile showed elevated total cholesterol and LDL.    Afebrile overnight, HR 50s-60s, SBP 100s-teens, O2 saturations within the normal range on room air.    Consultants/Specialty  Cardiology-Dr. Mccann    Code Status  Full code    Disposition  Monitor overnight, stress echocardiogram in the morning.    Review of Systems  Review of Systems   Constitutional: Negative for diaphoresis, fever and malaise/fatigue.   Respiratory: Negative for cough, shortness  of breath and wheezing.    Cardiovascular: Negative for chest pain, palpitations, orthopnea, leg swelling and PND.   Gastrointestinal: Negative for abdominal pain, constipation, diarrhea, nausea and vomiting.   Genitourinary: Negative for dysuria, frequency and urgency.   Neurological: Negative for dizziness, sensory change, speech change, focal weakness, weakness and headaches.   Endo/Heme/Allergies: Does not bruise/bleed easily.   Psychiatric/Behavioral: Negative for depression. The patient is not nervous/anxious and does not have insomnia.    All other systems reviewed and are negative.     Physical Exam  Temp:  [36.3 °C (97.4 °F)-36.8 °C (98.2 °F)] 36.5 °C (97.7 °F)  Pulse:  [55-79] 74  Resp:  [18] 18  BP: (105-118)/(36-65) 117/65  SpO2:  [93 %-97 %] 94 %    Physical Exam  Vitals signs and nursing note reviewed.   Constitutional:       General: She is awake.      Appearance: Normal appearance. She is well-developed. She is not ill-appearing.   HENT:      Head: Normocephalic and atraumatic.      Mouth/Throat:      Lips: Pink.      Mouth: Mucous membranes are moist.   Eyes:      Conjunctiva/sclera: Conjunctivae normal.      Pupils: Pupils are equal, round, and reactive to light.   Neck:      Musculoskeletal: Normal range of motion and neck supple.      Vascular: No JVD.   Cardiovascular:      Rate and Rhythm: Normal rate and regular rhythm.      Pulses: Normal pulses.      Heart sounds: Normal heart sounds.   Pulmonary:      Effort: Pulmonary effort is normal.      Breath sounds: Normal breath sounds.   Abdominal:      General: There is no distension or abdominal bruit.      Palpations: Abdomen is soft.      Tenderness: There is no abdominal tenderness.   Musculoskeletal:      Right lower leg: No edema.      Left lower leg: No edema.   Skin:     General: Skin is warm and dry.   Neurological:      General: No focal deficit present.      Mental Status: She is alert and oriented to person, place, and time.      GCS:  GCS eye subscore is 4. GCS verbal subscore is 5. GCS motor subscore is 6.   Psychiatric:         Attention and Perception: Attention and perception normal.         Mood and Affect: Mood and affect normal.         Speech: Speech normal.         Behavior: Behavior normal. Behavior is cooperative.         Thought Content: Thought content normal.         Cognition and Memory: Cognition and memory normal.         Judgment: Judgment normal.     Fluids    Intake/Output Summary (Last 24 hours) at 7/6/2020 1739  Last data filed at 7/6/2020 1233  Gross per 24 hour   Intake 450 ml   Output no documentation   Net 450 ml     Laboratory  Recent Labs     07/05/20  0938   WBC 4.4*   RBC 4.93   HEMOGLOBIN 14.7   HEMATOCRIT 45.2   MCV 91.7   MCH 29.8   MCHC 32.5*   RDW 40.8   PLATELETCT 253   MPV 8.6*     Recent Labs     07/05/20  0938   SODIUM 139   POTASSIUM 3.9   CHLORIDE 105   CO2 23   GLUCOSE 206*   BUN 12   CREATININE 0.69   CALCIUM 9.0     Recent Labs     07/06/20  0728   TRIGLYCERIDE 140   HDL 73   *     Imaging  Cardiac Stress Test Treadmill without Images   Final Result      DX-CHEST-PORTABLE (1 VIEW)   Final Result      No acute cardiac or pulmonary abnormality is noted.      EC-ECHOCARDIOGRAM REST/STRESS W/O CONT    (Results Pending)      Assessment/Plan  * Chest pain- (present on admission)  Assessment & Plan  -Positive treadmill stress test.  Cardiology consulted.  Stress echocardiogram ordered.  -Start ASA and switch to atorvastatin.  -Troponins negative.  -Chest x-ray unremarkable.  -Age-adjusted d-dimer normal.    Diabetes mellitus type 2 in nonobese (HCC)- (present on admission)  Assessment & Plan  -Currently diet controlled.  Has been off metformin for approximately 2-3 years.  -Continue diabetic diet.  -A1c in process.  -Accu-Cheks ACHS with SSI as required while inpatient.    Hyperlipidemia- (present on admission)  Assessment & Plan  -Changed her home simvastatin to atorvastatin today.  -Recommend  rechecking lipid profile in approximately 3 months as outpatient.    HTN (hypertension)- (present on admission)  Assessment & Plan  -Well-controlled on her home enalapril which has been continued.    HX: breast cancer- (present on admission)  Assessment & Plan  -History of breast cancer status post left mastectomy 9/11/2011.  -Not on tamoxifen, as did not have receptors.  -Did not need any radiation or chemo.     VTE prophylaxis: SCDs and ambulation      Electronically signed by:  Delia Mcfadden, MSN, RN, APRN, ACNPC-AG, CCRN  Nurse Practitioner, [facility]  Work # [phone]    7/6/2020    5:39 PM

## 2020-07-06 NOTE — PROGRESS NOTES
Patient rounded. POC discussed. Cardiac Stress Test, Treadmill discussed. NPO status discussed. Medications administered as ordered. Comfortable in bed. Appropriate safety precautions in place. Bed alarm refused. Will continue to monitor patient.

## 2020-07-06 NOTE — PROGRESS NOTES
Patient pleasant sitting up eating lunch. Pending stress test results for discharge. Bed in low locked position, call bell within reach. Continue to monitor.

## 2020-07-06 NOTE — PROGRESS NOTES
Telemetry Strip     Strip printed: 1415  Measurements from am strip were as follows:  Rhythm:sr with bbb  HR:74  Measurements:.16/.10/.36        Telemetry Shift Summary:    Rhythm:sr with bbb  HR: 54-80  Ectopy:        Normal Values  Rhythm SR  HR Range    Measurements 0.12-0.20 / 0.06-0.10  / 0.30-0.52

## 2020-07-06 NOTE — PROGRESS NOTES
Patient educated re: treadmill stress test. Nursing goals identified: knowledge deficit, potential for anxiety r/t stress test, potential for compromised cardiac output. Care plan includes educating patient, reassurance and access to ACLS cart/team. Pt denies CP, denies valve disease, denies taking Beta blocker, confirmed on MAR. Patient prepped for treadmill stress test. Pt achieved 123% of MPHR, MAX HR = 163 METS = 7.3, total exercise time 6:00. Procedure ended due to fatigue and target HR achieved. See documentation. Returned to 301-1 via w/c with Joanna (transport).

## 2020-07-06 NOTE — PROGRESS NOTES
Pt arrived to 301-1 from ED. A&Ox4. VSS. Ambulated from [facility] to bed independently. Denies chest pain, dizziness, palpitations, SOB. Placed on tele. Oriented to room, call bell within reach. Admit profile completed. NPO after midnight for stress test in AM, no caffeine, no decaf, no chocolate. EKG completed as ordered. Fall precautions/safety maintained.

## 2020-07-06 NOTE — PROGRESS NOTES
Patient rounded. Patient sleeping. Comfortable in bed. Appropriate safety precautions in place. Bed alarm refused. Will continue to monitor patient.

## 2020-07-06 NOTE — PROGRESS NOTES
Bedside report given to LUDY Chowdhury. POC discussed, all questions answered. Safety precautions in place. Care released.

## 2020-07-06 NOTE — PROGRESS NOTES
2 RN skin check complete  Device in place PIV, tele .  Skin assessed under devices c/d/i .  Confirmed pressure ulcers found on NA .  New potential pressure ulcers found on NA . Wound consult placed NA .  The following interventions in place pt ambulates independently, repositions self frequently in bed, education provided, verbalized understanding .

## 2020-07-06 NOTE — PROGRESS NOTES
Telemetry Shift Summary    Rhythm SR  HR Range 60s-70s  Ectopy R-PVC  Measurements 0.16/0.10/0.38        Normal Values  Rhythm SR  HR Range    Measurements 0.12-0.20 / 0.06-0.10  / 0.30-0.52

## 2020-07-06 NOTE — CARE PLAN
Problem: Communication  Goal: The ability to communicate needs accurately and effectively will improve  Outcome: PROGRESSING AS EXPECTED  Note: Patient updated on the plan of care. Encouraged to voice feelings and to ask questions. Answered all questions and concerns. Agrees with the plan of care.      Problem: Safety  Goal: Will remain free from injury  Outcome: PROGRESSING AS EXPECTED  Note: Safety precautions in place. Bed alarm Refused. Will continue to monitor patient.   Goal: Will remain free from falls  Outcome: PROGRESSING AS EXPECTED  Note: Safety precautions in place. Bed alarm Refused. Will continue to monitor patient.      Problem: Infection  Goal: Will remain free from infection  Outcome: PROGRESSING AS EXPECTED  Note: Discussed the importance of infection prevention and hand hygiene.

## 2020-07-06 NOTE — CONSULTS
Cardiology Initial Consultation    Date of Service  7/6/2020    Referring Physician  VI Duff    Reason for Consultation  Chest pain and abnormal stress test    History of Presenting Illness  Hilda Garcia is a 65 y.o. female who presented 7/5/2020 with chest pain.  She had been face timing with her grandchildren.  She noted sharp stabbing pain in the left chest with radiating dull pain to her left shoulder blade and down her left side.  The stabbing chest pain only lasted 5 minutes, the dull aching chest pain lasted for over an hour.  She took an aspirin at home and was given more aspirin in the emergency room.  She never took nitroglycerin.    Presenting ECG with nonspecific T wave changes.  High-sensitivity troponin less than 6, less than 6, 9.  Was admitted the hospital underwent a treadmill stress test.  Had significant ST changes which were upsloping.  Noted to have drop in blood pressure during stress.    Has diabetes, previously on medications but controlled with diet and exercise alone the past 2 years  Has hyperlipidemia, LDL cholesterol 124 despite simvastatin.  Has hypertension, on medication, blood pressures been essentially normal here with the exception of slightly elevated in the emergency room.  Brother had MI with stent in his mid to late 60s.  No prior smoking history.  No history of autoimmune disease such as lupus or rheumatoid arthritis.  No chronic kidney disease.  No ETOH overuse.  No caffeine overuse.  Prior breast cancer but surgery only, no chemo or radiation, around 2001.    She walks 2 to 5 miles daily with her .  She is not limited by chest pain, pressure or tightness with activity.   No significant dyspnea on exertion, orthopnea or lower extremity swelling.   No significant palpitations, lightheadedness, or presyncope/syncope.   No symptoms of leg claudication.   No stroke/TIA like symptoms.    Her son is Ritesh Zenon Jose, a hospitalist at the [facility] and with the  rounding service at [facility].  She is accompanied by her .    Review of Systems  Review of Systems   All other systems reviewed and are negative.      Past Medical History   has a past medical history of Asthma, Breast cancer (HCC), Glenoid labrum tear (11/7/2011), HTN (hypertension) (10/3/2011), breast cancer (10/3/2011), Hyperlipidemia (10/3/2011), Tendonitis of shoulder (11/7/2011), and Type II or unspecified type diabetes mellitus without mention of complication, not stated as uncontrolled (10/3/2011).    Surgical History   has a past surgical history that includes mastectomy modified radical; breast reconstruction; breast implant removal; dilation and curettage; and mastectomy (2002).    Family History  family history includes Arthritis in her daughter; Cancer in her father, maternal aunt, and mother; Lung Disease in her father; Stroke in her mother.    Social History   reports that she has never smoked. She has never used smokeless tobacco. She reports that she does not drink alcohol or use drugs.    Medications  Prior to Admission Medications   Prescriptions Last Dose Informant Patient Reported? Taking?   Calcium Carb-Cholecalciferol (CALCIUM 600+D3 PO) 7/5/2020 at am  Yes No   Sig: Take 1 Tab by mouth every morning.   Cyanocobalamin (VITAMIN B-12 PO) 7/5/2020 at am  Yes Yes   Sig: Take 1 Tab by mouth every day.   aspirin EC (ECOTRIN) 81 MG TBEC 7/5/2020 at am  Yes No   Sig: Take 81 mg by mouth every bedtime.   enalapril (VASOTEC) 10 MG Tab 7/5/2020 at am  No No   Sig: Take 1 Tab by mouth every day.   simvastatin (ZOCOR) 40 MG Tab 7/4/2020 at hs  No No   Sig: Take 1 Tab by mouth every bedtime.   therapeutic multivitamin-minerals (THERAGRAN-M) Tab 7/5/2020 at am  Yes No   Sig: Take 1 Tab by mouth every day.      Facility-Administered Medications: None       Allergies  Allergies   Allergen Reactions   • Asa [Aspirin]      As child in [facility]- had bulging eyes. Tolerates asa 81 mg.        Vital signs in  last 24 hours  Temp:  [36.3 °C (97.4 °F)-36.8 °C (98.2 °F)] 36.3 °C (97.4 °F)  Pulse:  [55-79] 79  Resp:  [18-19] 18  BP: (105-131)/(36-61) 116/61  SpO2:  [93 %-97 %] 93 %    Physical Exam  Physical Exam     General: No acute distress. Well nourished.  HEENT: EOM grossly intact, no scleral icterus, no pharyngeal erythema.   Neck:  No JVD, no bruits, trachea midline  CVS: RRR. Normal S1, S2. No M/R/G. No LE edema.  2+ radial pulses, 2+ PT pulses  Resp: CTAB. No wheezing or crackles/rhonchi. Normal respiratory effort.  Abdomen: Soft, NT, no whitney hepatomegaly.  MSK/Ext: No clubbing or cyanosis.  Skin: Warm and dry, no rashes.  Neurological: CN III-XII grossly intact. No focal deficits.   Psych: A&O x 3, appropriate affect, good judgement      Lab Review  Lab Results   Component Value Date/Time    WBC 4.4 (L) 07/05/2020 09:38 AM    RBC 4.93 07/05/2020 09:38 AM    HEMOGLOBIN 14.7 07/05/2020 09:38 AM    HEMATOCRIT 45.2 07/05/2020 09:38 AM    MCV 91.7 07/05/2020 09:38 AM    MCH 29.8 07/05/2020 09:38 AM    MCHC 32.5 (L) 07/05/2020 09:38 AM    MPV 8.6 (L) 07/05/2020 09:38 AM      Lab Results   Component Value Date/Time    SODIUM 139 07/05/2020 09:38 AM    POTASSIUM 3.9 07/05/2020 09:38 AM    CHLORIDE 105 07/05/2020 09:38 AM    CO2 23 07/05/2020 09:38 AM    GLUCOSE 206 (H) 07/05/2020 09:38 AM    BUN 12 07/05/2020 09:38 AM    CREATININE 0.69 07/05/2020 09:38 AM      Lab Results   Component Value Date/Time    ASTSGOT 20 07/05/2020 09:38 AM    ALTSGPT 15 07/05/2020 09:38 AM     Lab Results   Component Value Date/Time    CHOLSTRLTOT 225 (H) 07/06/2020 07:28 AM     (H) 07/06/2020 07:28 AM    HDL 73 07/06/2020 07:28 AM    TRIGLYCERIDE 140 07/06/2020 07:28 AM    TROPONINT 9 07/06/2020 07:28 AM       No results for input(s): NTPROBNP in the last 72 hours.    Cardiac Imaging and Procedures Review  ECG 7/5/2020  Sinus, 65, nonspecific anterior T wave changes    EKG:  My personal interpretation of the EKG dated 7/5/2020 is  sinus, 59    Echocardiogram: None    Cardiac Catheterization: None    Imaging  Chest X-Ray: 7/6/2020  No acute cardiopulmonary process    Stress Test: 7/6/2020  Positive stress treadmill ECG for ischemia.   II, III, aVF 1 mm upsloping ST depression starting at stage 2 resolved    with recovery. V3-V6 0.5 mm Upsloping ST depression at stage 3 resolved    with recovery. aVR 1 mm ST elevation starting stage 3 of exercise resolved    with recovery.    Insufficient BP increase with prolonged exercise. >20mmHg drop from stage    1 to stage 3 of exercise.    No sustained arrhythmias.   [facility] treadmill score +1; intermediate risk.      Findings of this study communicated to Dr Glez.    Assessment/Plan  -Atypical chest pain with normal troponin  -Abnormal stress test, upsloping ST changes with drop in blood pressure but no symptoms  -Type 2 diabetes, diet only  -Mixed hyperlipidemia, not yet to goal  -Family history of coronary disease, brother    Plan:  -She felt well on the treadmill, however, drop in blood pressure was most concerning to me.  She agrees to repeat treadmill stress test with echo images tomorrow.  -No evidence of ACS given normal high-sensitivity troponin  -Risk factors for some coronary disease but clinically tolerates exercise well.  -If her stress echocardiogram is normal, she can be discharged home and we will arrange for short follow-up in our office to make sure she is back to regular exercise and feeling well.  -Continue primary prevention baby aspirin as an outpatient  -Change simvastatin 40 milligrams to Lipitor 40 mg to achieve LDL under 100  -I did discuss with him that a normal treadmill stress echo tomorrow implies that there is not 70% or more significant blockage in her artery but I do recommend continued medical therapy as if she has coronary disease including her aspirin and statin.  -I also discussed that a treadmill stress test does not mean she could not have acute coronary syndrome in  the future.  -I updated her son via phone who agrees with the plan.  All questions answered.    Discussed with VI Duff    Thank you for allowing me to participate in the care of this patient.    I will continue to follow this patient    Please contact me with any questions.    Shana Mccann M.D.   Cardiologist, [facility]  [phone]

## 2020-07-06 NOTE — PROGRESS NOTES
Received bedside patient report from LUDY Saldana. Patient resting comfortably in bed, no complaints at this time. Safety precautions in place. Will continue to monitor.

## 2020-07-06 NOTE — FLOWSHEET NOTE
07/05/20 1719   Vital Signs   Pulse 64   Respiration 18   Pulse Oximetry 95 %   Breath Sounds   RUL Breath Sounds Clear   RML Breath Sounds Clear   RLL Breath Sounds Diminished   CATHIE Breath Sounds Clear   LLL Breath Sounds Diminished   Oxygen   O2 (LPM) 0   O2 Delivery Device Room air w/o2 available

## 2020-07-07 ENCOUNTER — APPOINTMENT (OUTPATIENT)
Dept: CARDIOLOGY | Facility: MEDICAL CENTER | Age: 65
End: 2020-07-07
Attending: NURSE PRACTITIONER
Payer: MEDICARE

## 2020-07-07 VITALS
DIASTOLIC BLOOD PRESSURE: 46 MMHG | RESPIRATION RATE: 18 BRPM | HEIGHT: 66 IN | SYSTOLIC BLOOD PRESSURE: 100 MMHG | HEART RATE: 58 BPM | WEIGHT: 146.16 LBS | TEMPERATURE: 98 F | OXYGEN SATURATION: 96 % | BODY MASS INDEX: 23.49 KG/M2

## 2020-07-07 LAB
GLUCOSE BLD-MCNC: 100 MG/DL (ref 65–99)
GLUCOSE BLD-MCNC: 102 MG/DL (ref 65–99)
LV EJECT FRACT  99904: 65
SARS-COV-2 RNA RESP QL NAA+PROBE: NOTDETECTED
SPECIMEN SOURCE: NORMAL

## 2020-07-07 PROCEDURE — 93017 CV STRESS TEST TRACING ONLY: CPT

## 2020-07-07 PROCEDURE — 93350 STRESS TTE ONLY: CPT | Mod: 26 | Performed by: INTERNAL MEDICINE

## 2020-07-07 PROCEDURE — 99217 PR OBSERVATION CARE DISCHARGE: CPT | Performed by: HOSPITALIST

## 2020-07-07 PROCEDURE — G0378 HOSPITAL OBSERVATION PER HR: HCPCS

## 2020-07-07 PROCEDURE — A9270 NON-COVERED ITEM OR SERVICE: HCPCS | Performed by: INTERNAL MEDICINE

## 2020-07-07 PROCEDURE — 93018 CV STRESS TEST I&R ONLY: CPT | Performed by: INTERNAL MEDICINE

## 2020-07-07 PROCEDURE — A9270 NON-COVERED ITEM OR SERVICE: HCPCS | Performed by: HOSPITALIST

## 2020-07-07 PROCEDURE — 82962 GLUCOSE BLOOD TEST: CPT

## 2020-07-07 PROCEDURE — 700102 HCHG RX REV CODE 250 W/ 637 OVERRIDE(OP): Performed by: HOSPITALIST

## 2020-07-07 PROCEDURE — 700102 HCHG RX REV CODE 250 W/ 637 OVERRIDE(OP): Performed by: INTERNAL MEDICINE

## 2020-07-07 RX ORDER — ATORVASTATIN CALCIUM 40 MG/1
40 TABLET, FILM COATED ORAL EVERY EVENING
Qty: 30 TAB | Refills: 2 | Status: SHIPPED | OUTPATIENT
Start: 2020-07-07 | End: 2023-02-14

## 2020-07-07 RX ADMIN — CALCIUM CARBONATE-CHOLECALCIFEROL TAB 250 MG-125 UNIT 2 TABLET: 250-125 TAB at 05:39

## 2020-07-07 RX ADMIN — ENALAPRIL MALEATE 10 MG: 5 TABLET ORAL at 05:39

## 2020-07-07 RX ADMIN — MULTIPLE VITAMINS W/ MINERALS TAB 1 TABLET: TAB at 05:40

## 2020-07-07 RX ADMIN — VITAM B12 100 MCG: 100 TAB at 05:40

## 2020-07-07 RX ADMIN — ASPIRIN 81 MG: 81 TABLET, COATED ORAL at 05:39

## 2020-07-07 NOTE — ASSESSMENT & PLAN NOTE
-Changed her home simvastatin to atorvastatin today.  -Recommend rechecking lipid profile in approximately 3 months as outpatient.

## 2020-07-07 NOTE — PROGRESS NOTES
Telemetry Shift Summary     Rhythm SB/SR  HR Range 52 - 74  Ectopy R PAC  Measurements 0.20 / 0.08 / 0.42           Normal Values  Rhythm SR  HR Range    Measurements 0.12-0.20 / 0.06-0.10  / 0.30-0.52

## 2020-07-07 NOTE — PROGRESS NOTES
Received report from LUDY Chowdhury. Patient was awake at the time with no complaints. Patient's echo is scheduled for 1100 - pt educated on NPO status. Safety measures are in place.

## 2020-07-07 NOTE — CARE PLAN
Problem: Communication  Goal: The ability to communicate needs accurately and effectively will improve  Outcome: PROGRESSING AS EXPECTED   Patient updated on echo scheduled for this AM and NPO status. Patient verbalized understanding     Problem: Discharge Barriers/Planning  Goal: Patient's continuum of care needs will be met  Outcome: PROGRESSING AS EXPECTED   Patient educated that expected discharge is today, pending echo results. Patient verbalized understanding

## 2020-07-07 NOTE — DISCHARGE INSTRUCTIONS
Discharge Instructions    Discharged to home by car with relative. Discharged via wheelchair, hospital escort: Yes.  Special equipment needed: Not Applicable    Be sure to schedule a follow-up appointment with your primary care doctor or any specialists as instructed.     Discharge Plan:   Diet Plan: Discussed  Activity Level: Discussed  Confirmed Follow up Appointment: Patient to Call and Schedule Appointment  Confirmed Symptoms Management: Discussed  Medication Reconciliation Updated: Yes    I understand that a diet low in cholesterol, fat, and sodium is recommended for good health. Unless I have been given specific instructions below for another diet, I accept this instruction as my diet prescription.   Other diet: Cardiac/diabetic     Special Instructions: None    · Is patient discharged on Warfarin / Coumadin?   No     Chest Pain Observation  It is often hard to give a specific diagnosis for the cause of chest pain. Among other possibilities your symptoms might be caused by inadequate oxygen delivery to your heart (angina). Angina that is not treated or evaluated can lead to a heart attack (myocardial infarction) or death.  Blood tests, electrocardiograms, and X-rays may have been done to help determine a possible cause of your chest pain. After evaluation and observation, your health care provider has determined that it is unlikely your pain was caused by an unstable condition that requires hospitalization. However, a full evaluation of your pain may need to be completed, with additional diagnostic testing as directed. It is very important to keep your follow-up appointments. Not keeping your follow-up appointments could result in permanent heart damage, disability, or death. If there is any problem keeping your follow-up appointments, you must call your health care provider.  HOME CARE INSTRUCTIONS   Due to the slight chance that your pain could be angina, it is important to follow your health care provider's  treatment plan and also maintain a healthy lifestyle:  · Maintain or work toward achieving a healthy weight.  · Stay physically active and exercise regularly.  · Decrease your salt intake.  · Eat a balanced, healthy diet. Talk to a dietitian to learn about heart-healthy foods.  · Increase your fiber intake by including whole grains, vegetables, fruits, and nuts in your diet.  · Avoid situations that cause stress, anger, or depression.  · Take medicines as advised by your health care provider. Report any side effects to your health care provider. Do not stop medicines or adjust the dosages on your own.  · Quit smoking. Do not use nicotine patches or gum until you check with your health care provider.  · Keep your blood pressure, blood sugar, and cholesterol levels within normal limits.  · Limit alcohol intake to no more than 1 drink per day for women who are not pregnant and 2 drinks per day for men.  · Do not abuse drugs.  SEEK IMMEDIATE MEDICAL CARE IF:  You have severe chest pain or pressure which may include symptoms such as:  · You feel pain or pressure in your arms, neck, jaw, or back.  · You have severe back or abdominal pain, feel sick to your stomach (nauseous), or throw up (vomit).  · You are sweating profusely.  · You are having a fast or irregular heartbeat.  · You feel short of breath while at rest.  · You notice increasing shortness of breath during rest, sleep, or with activity.  · You have chest pain that does not get better after rest or after taking your usual medicine.  · You wake from sleep with chest pain.  · You are unable to sleep because you cannot breathe.  · You develop a frequent cough or you are coughing up blood.  · You feel dizzy, faint, or experience extreme fatigue.  · You develop severe weakness, dizziness, fainting, or chills.  Any of these symptoms may represent a serious problem that is an emergency. Do not wait to see if the symptoms will go away. Call your local emergency  services (911 in the [place] Do not drive yourself to the hospital.  MAKE SURE YOU:  · Understand these instructions.  · Will watch your condition.  · Will get help right away if you are not doing well or get worse.     This information is not intended to replace advice given to you by your health care provider. Make sure you discuss any questions you have with your health care provider.     Document Released: 01/20/2012 Document Revised: 12/23/2014 Document Reviewed: 06/19/2014  [employer] Interactive Patient Education ©2016 [employer].      Exercise Stress Echocardiogram, Care After  This sheet gives you information about how to care for yourself after your procedure. Your doctor may also give you more specific instructions. If you have problems or questions, call your doctor.  Follow these instructions at home:    · You may do these things as told by your doctor:  ? Eat what you normally eat.  ? Do your normal activities.  ? Take your normal medicines.  · Take over-the-counter and prescription medicines only as told by your doctor.  · Keep all follow-up visits as told by your doctor. This is important.  Contact a doctor if:  · You keep feeling dizzy or light-headed.  · You feel like your heart is beating fast.  · You keep feeling sick to your stomach (nauseous) or you throw up (vomit).  · You have a headache.  · You feel short of breath.  Get help right away if:  · You have pain or pressure in any of these areas:  ? Your chest.  ? Your jaw or neck.  ? Between your shoulder blades.  ? Down your left arm.  · You pass out (faint).  · You have trouble breathing.  Summary  · After your procedure, you may eat like normal, do your normal activities, and take your normal medicines as told by your doctor.  · Contact your doctor if you have dizziness, a fast heartbeat, or a headache.  · You should also contact your doctor if you feel sick to your stomach (nauseous), you throw up (vomit), or you feel short of breath.  · Get  help right away if you feel pain or pressure in any of these areas: your chest, jaw, neck, between your shoulder blades, or down your right arm.  · You should also get help right away if you pass out (faint) or have trouble breathing.  This information is not intended to replace advice given to you by your health care provider. Make sure you discuss any questions you have with your health care provider.  Document Released: 10/08/2014 Document Revised: 09/11/2017 Document Reviewed: 09/11/2017  [employer] Patient Education © 2020 Elsevier Inc.        Atorvastatin tablets  What is this medicine?  ATORVASTATIN (a TORE va sta tin) is known as a HMG-CoA reductase inhibitor or 'statin'. It lowers the level of cholesterol and triglycerides in the blood. This drug may also reduce the risk of heart attack, stroke, or other health problems in patients with risk factors for heart disease. Diet and lifestyle changes are often used with this drug.  This medicine may be used for other purposes; ask your health care provider or pharmacist if you have questions.  COMMON BRAND NAME(S): Lipitor  What should I tell my health care provider before I take this medicine?  They need to know if you have any of these conditions:  · diabetes  · if you often drink alcohol  · history of stroke  · kidney disease  · liver disease  · muscle aches or weakness  · thyroid disease  · an unusual or allergic reaction to atorvastatin, other medicines, foods, dyes, or preservatives  · pregnant or trying to get pregnant  · breast-feeding  How should I use this medicine?  Take this medicine by mouth with a glass of water. Follow the directions on the prescription label. You can take it with or without food. If it upsets your stomach, take it with food. Do not take with grapefruit juice. Take your medicine at regular intervals. Do not take it more often than directed. Do not stop taking except on your doctor's advice.  Talk to your pediatrician regarding the  use of this medicine in children. While this drug may be prescribed for children as young as 10 for selected conditions, precautions do apply.  Overdosage: If you think you have taken too much of this medicine contact a poison control center or emergency room at once.  NOTE: This medicine is only for you. Do not share this medicine with others.  What if I miss a dose?  If you miss a dose, take it as soon as you can. If your next dose is to be taken in less than 12 hours, then do not take the missed dose. Take the next dose at your regular time. Do not take double or extra doses.  What may interact with this medicine?  Do not take this medicine with any of the following medications:  · dasabuvir; ombitasvir; paritaprevir; ritonavir  · ombitasvir; paritaprevir; ritonavir  · posaconazole  · red yeast rice  This medicine may also interact with the following medications:  · alcohol  · birth control pills  · certain antibiotics like erythromycin and clarithromycin  · certain antivirals for HIV or hepatitis  · certain medicines for cholesterol like fenofibrate, gemfibrozil, and niacin  · certain medicines for fungal infections like ketoconazole and itraconazole  · colchicine  · cyclosporine  · digoxin  · grapefruit juice  · rifampin  This list may not describe all possible interactions. Give your health care provider a list of all the medicines, herbs, non-prescription drugs, or dietary supplements you use. Also tell them if you smoke, drink alcohol, or use illegal drugs. Some items may interact with your medicine.  What should I watch for while using this medicine?  Visit your doctor or health care professional for regular check-ups. You may need regular tests to make sure your liver is working properly.  Your health care professional may tell you to stop taking this medicine if you develop muscle problems. If your muscle problems do not go away after stopping this medicine, contact your health care professional.  Do not  become pregnant while taking this medicine. Women should inform their health care professional if they wish to become pregnant or think they might be pregnant. There is a potential for serious side effects to an unborn child. Talk to your health care professional or pharmacist for more information. Do not breast-feed an infant while taking this medicine.  This medicine may increase blood sugar. Ask your healthcare provider if changes in diet or medicines are needed if you have diabetes.  If you are going to need surgery or other procedure, tell your doctor that you are using this medicine.  This drug is only part of a total heart-health program. Your doctor or a dietician can suggest a low-cholesterol and low-fat diet to help. Avoid alcohol and smoking, and keep a proper exercise schedule.  This medicine may cause a decrease in Co-Enzyme Q-10. You should make sure that you get enough Co-Enzyme Q-10 while you are taking this medicine. Discuss the foods you eat and the vitamins you take with your health care professional.  What side effects may I notice from receiving this medicine?  Side effects that you should report to your doctor or health care professional as soon as possible:  · allergic reactions like skin rash, itching or hives, swelling of the face, lips, or tongue  · fever  · joint pain  · loss of memory  · redness, blistering, peeling or loosening of the skin, including inside the mouth  · signs and symptoms of high blood sugar such as being more thirsty or hungry or having to urinate more than normal. You may also feel very tired or have blurry vision.  · signs and symptoms of liver injury like dark yellow or brown urine; general ill feeling or flu-like symptoms; light-belly pain; unusually weak or tired; yellowing of the eyes or skin  · signs and symptoms of muscle injury like dark urine; trouble passing urine or change in the amount of urine; unusually weak or tired; muscle pain or side or back  pain  Side effects that usually do not require medical attention (report to your doctor or health care professional if they continue or are bothersome):  · diarrhea  · nausea  · stomach pain  · trouble sleeping  · upset stomach  This list may not describe all possible side effects. Call your doctor for medical advice about side effects. You may report side effects to FDA at 3-070-GCO-2608.  Where should I keep my medicine?  Keep out of the reach of children.  Store between 20 and 25 degrees C (68 and 77 degrees F). Throw away any unused medicine after the expiration date.  NOTE: This sheet is a summary. It may not cover all possible information. If you have questions about this medicine, talk to your doctor, pharmacist, or health care provider.  © 2020 Elsevier/Gold Standard (2019-10-09 11:36:16)      Depression / Suicide Risk    As you are discharged from this [facility] facility, it is important to learn how to keep safe from harming yourself.    Recognize the warning signs:  · Abrupt changes in personality, positive or negative- including increase in energy   · Giving away possessions  · Change in eating patterns- significant weight changes-  positive or negative  · Change in sleeping patterns- unable to sleep or sleeping all the time   · Unwillingness or inability to communicate  · Depression  · Unusual sadness, discouragement and loneliness  · Talk of wanting to die  · Neglect of personal appearance   · Rebelliousness- reckless behavior  · Withdrawal from people/activities they love  · Confusion- inability to concentrate     If you or a loved one observes any of these behaviors or has concerns about self-harm, here's what you can do:  · Talk about it- your feelings and reasons for harming yourself  · Remove any means that you might use to hurt yourself (examples: pills, rope, extension cords, firearm)  · Get professional help from the community (Mental Health, Substance Abuse, psychological counseling)  · Do  not be alone:Call your Safe Contact- someone whom you trust who will be there for you.  · Call your local CRISIS HOTLINE 303-5355 or 552-722-4316  · Call your local Children's Mobile Crisis Response Team [place] (661) 284-2915 or www.Hellotravel  · Call the toll free National Suicide Prevention Hotlines   · National Suicide Prevention Lifeline 377-406-VFHR (8987)  · National [place] Line Network 800-SUICIDE (674-8901)

## 2020-07-07 NOTE — PROGRESS NOTES
Received bedside patient report from LUDY Londono. Patient resting comfortably in bed, no complaints at this time. Safety precautions in place. Will continue to monitor.

## 2020-07-08 NOTE — DISCHARGE SUMMARY
Hospital Medicine Discharge Note     Admit Date:  7/5/2020       Discharge Date:   7/7/2020    Attending Physician:  Maik Jhaveri M.D.     Diagnoses (includes active and resolved):   Principal Problem:    Chest pain POA: Yes  Active Problems:    HX: breast cancer POA: Yes    HTN (hypertension) POA: Yes    Hyperlipidemia POA: Yes    Diabetes mellitus type 2 in nonobese (HCC) POA: Yes  Resolved Problems:    * No resolved hospital problems. *      Hospital Summary (Brief Narrative):       65 y.o. female who presented 7/5/2020 with sudden onset of chest pain.  The patient's [employer] group then started singing and dancing so she jumped up and started singing and dancing.  Afterwards she developed chest pain that was is 8 out of 10 intensity was sharp and stabbing.   Patient was admitted.  She was taken for a nuclear stress test and this was found to be abnormal.  Thus, cardiology was consulted.  Patient was then scheduled for a stress echo.  This was done and unremarkable.  The case was discussed with cardiology and the patient was able to be discharged home.  Her simvastatin was changed over to Lipitor in order to achieve a LDL under 100 as her goal.  She may also continue aspirin outpatient.  She may follow-up with cardiology outpatient.         Consultants:      Cardiologist Dr. Mccann    Procedures:        None    Discharge Medications:           Medication List      START taking these medications      Instructions   atorvastatin 40 MG Tabs  Commonly known as:  LIPITOR   Take 1 Tab by mouth every evening.  Dose:  40 mg        CONTINUE taking these medications      Instructions   aspirin EC 81 MG Tbec  Commonly known as:  ECOTRIN   Take 81 mg by mouth every bedtime.  Dose:  81 mg     CALCIUM 600+D3 PO   Take 1 Tab by mouth every morning.  Dose:  1 Tab     enalapril 10 MG Tabs  Commonly known as:  VASOTEC   Take 1 Tab by mouth every day.  Dose:  10 mg     therapeutic multivitamin-minerals Tabs   Take 1 Tab by mouth  every day.  Dose:  1 Tab     VITAMIN B-12 PO   Take 1 Tab by mouth every day.  Dose:  1 Tab        STOP taking these medications    simvastatin 40 MG Tabs  Commonly known as:  ZOCOR              Disposition:   Discharge home    Activity:   As tolerated    Code status:   Full code    Primary Care Provider:    Pcp Pt States None    Follow up appointment details :      Shana Mccann M.D.  [address] [place] [address] [address]  [place] [id number] [phone]    Schedule an appointment as soon as possible for a visit      Alanis Reece M.D.  [address] [facility] [address]  [place] [id number] [phone]    Schedule an appointment as soon as possible for a visit in 1 week      No future appointments.    Pending Studies:        None    #################################################    Interval history/exam for day of discharge:    Vitals:    07/06/20 1908 07/06/20 2305 07/07/20 0308 07/07/20 0705   BP: 104/65 101/53 (!) 99/44 100/46   Pulse: 68 (!) 57 (!) 51 (!) 58   Resp: 18 18 18 18   Temp: 36.4 °C (97.5 °F) 36.7 °C (98.1 °F) 36.4 °C (97.6 °F) 36.7 °C (98 °F)   TempSrc: Oral Oral Oral Oral   SpO2: 94% 91% 95% 96%   Weight:       Height:         Weight/BMI: Body mass index is 23.59 kg/m².  Pulse Oximetry: 96 %, O2 (LPM): 0, O2 Delivery Device: None - Room Air    General:  NAD  CVS:  RRR  PULM:  CTAB, no respiratory distress    Most Recent Labs:    Lab Results   Component Value Date/Time    WBC 4.4 (L) 07/05/2020 09:38 AM    RBC 4.93 07/05/2020 09:38 AM    HEMOGLOBIN 14.7 07/05/2020 09:38 AM    HEMATOCRIT 45.2 07/05/2020 09:38 AM    MCV 91.7 07/05/2020 09:38 AM    MCH 29.8 07/05/2020 09:38 AM    MCHC 32.5 (L) 07/05/2020 09:38 AM    MPV 8.6 (L) 07/05/2020 09:38 AM    NEUTSPOLYS 53.00 07/05/2020 09:38 AM    LYMPHOCYTES 37.90 07/05/2020 09:38 AM    MONOCYTES 6.40 07/05/2020 09:38 AM    EOSINOPHILS 1.80 07/05/2020 09:38 AM    BASOPHILS 0.70 07/05/2020 09:38 AM      Lab Results   Component Value Date/Time    SODIUM 139 07/05/2020  09:38 AM    POTASSIUM 3.9 07/05/2020 09:38 AM    CHLORIDE 105 07/05/2020 09:38 AM    CO2 23 07/05/2020 09:38 AM    GLUCOSE 206 (H) 07/05/2020 09:38 AM    BUN 12 07/05/2020 09:38 AM    CREATININE 0.69 07/05/2020 09:38 AM      Lab Results   Component Value Date/Time    ALTSGPT 15 07/05/2020 09:38 AM    ASTSGOT 20 07/05/2020 09:38 AM    ALKPHOSPHAT 66 07/05/2020 09:38 AM    TBILIRUBIN 0.4 07/05/2020 09:38 AM    ALBUMIN 4.4 07/05/2020 09:38 AM    GLOBULIN 2.3 07/05/2020 09:38 AM     No results found for: PROTHROMBTM, INR     Imaging/ Testing:      EC-ECHOCARDIOGRAM REST/STRESS W/O CONT   Final Result      Cardiac Stress Test Treadmill without Images   Final Result      DX-CHEST-PORTABLE (1 VIEW)   Final Result      No acute cardiac or pulmonary abnormality is noted.          Instructions:      The patient was instructed to return to the ER in the event of worsening symptoms. I have counseled the patient on the importance of compliance and the patient has agreed to proceed with all medical recommendations and follow up plan indicated above.   The patient understands that all medications come with benefits and risks. Risks may include permanent injury or death and these risks can be minimized with close reassessment and monitoring.

## 2020-07-08 NOTE — PROGRESS NOTES
Telemetry Shift Summary    Rhythm SR/SB  HR Range 50s-90s  Ectopy n/a  Measurements 0.18/0.10/0.38        Normal Values  Rhythm SR  HR Range    Measurements 0.12-0.20 / 0.06-0.10  / 0.30-0.52

## 2020-07-08 NOTE — PROGRESS NOTES
Patient discharged to home. Prescriptions sent to [facility] pharmacy. Patient verbalizes discharge instructions. IV and tele monitor removed. Patient off the floor via wheelchair with RN.

## 2020-07-14 ENCOUNTER — OFFICE VISIT (OUTPATIENT)
Dept: CARDIOLOGY | Facility: MEDICAL CENTER | Age: 65
End: 2020-07-14
Payer: MEDICARE

## 2020-07-14 VITALS
DIASTOLIC BLOOD PRESSURE: 72 MMHG | HEIGHT: 66 IN | HEART RATE: 82 BPM | OXYGEN SATURATION: 93 % | SYSTOLIC BLOOD PRESSURE: 120 MMHG | RESPIRATION RATE: 14 BRPM | BODY MASS INDEX: 23.33 KG/M2 | WEIGHT: 145.17 LBS

## 2020-07-14 DIAGNOSIS — E11.9 DM TYPE 2 WITHOUT RETINOPATHY (HCC): ICD-10-CM

## 2020-07-14 DIAGNOSIS — E11.9 DIABETES MELLITUS TYPE 2 IN NONOBESE (HCC): ICD-10-CM

## 2020-07-14 DIAGNOSIS — R94.39 ABNORMAL STRESS ECG WITH TREADMILL: ICD-10-CM

## 2020-07-14 DIAGNOSIS — I10 ESSENTIAL HYPERTENSION: ICD-10-CM

## 2020-07-14 DIAGNOSIS — E78.00 PURE HYPERCHOLESTEROLEMIA: ICD-10-CM

## 2020-07-14 DIAGNOSIS — R07.89 OTHER CHEST PAIN: ICD-10-CM

## 2020-07-14 PROCEDURE — 99214 OFFICE O/P EST MOD 30 MIN: CPT | Performed by: INTERNAL MEDICINE

## 2020-07-14 ASSESSMENT — FIBROSIS 4 INDEX: FIB4 SCORE: 1.33

## 2020-07-14 NOTE — LETTER
[facility]   [address]  [place] [address]  Phone: [phone]  Fax: [phone]              Hilda Garcia  1955    Encounter Date: 7/14/2020    Shana Mccann M.D.          PROGRESS NOTE:  Subjective:   Chief Complaint:   Chief Complaint   Patient presents with   • Chest Pain       Hilda Garcia is a 65 y.o. female who returns today after recent hospital admission for chest pain.    She presented 7/5/2020 with chest pain.  She had been face timing with her grandchildren.  She noted sharp stabbing pain in the left chest with radiating dull pain to her left shoulder blade and down her left side.  The stabbing chest pain only lasted 5 minutes, the dull aching chest pain lasted for over an hour.    She took an aspirin at home and was given more aspirin in the emergency room.    She never took nitroglycerin.     Presenting ECG with nonspecific T wave changes.  High-sensitivity troponin less than 6, less than 6, 9.  Was admitted the hospital underwent a treadmill stress test.  Had significant ST changes which were upsloping.  Noted to have drop in blood pressure during stress.  We repeated a treadmill stress echo, she exercised well, had normal blood pressure response to exercise and had normal pre-and post stress images and was discharged home.     Has diabetes, previously on medications but controlled with diet and exercise alone the past 2 years  Has hyperlipidemia, LDL cholesterol 124 despite simvastatin.  Has hypertension, on medication, blood pressures been essentially normal here with the exception of slightly elevated in the emergency room.  Brother had MI with stent in his mid to late 60s.  No prior smoking history.  No history of autoimmune disease such as lupus or rheumatoid arthritis.  No chronic kidney disease.  No ETOH overuse.  No caffeine overuse.  Prior breast cancer but surgery only, no chemo or radiation, around  2001.     She walks 2 to 5 miles daily with her .  She has not had CP. Did have shoulder pain, reproducible on exam.  She is not limited by chest pain, pressure or tightness with activity.   No significant dyspnea on exertion, orthopnea or lower extremity swelling.   No significant palpitations, lightheadedness, or presyncope/syncope.   No symptoms of leg claudication.   No stroke/TIA like symptoms.     Her son is Dr. Zenon Garcia, a [facility] and with the rounding service at [facility].  She is accompanied by her .    DATA REVIEWED by me:  ECG 7/5/2020  Sinus, 65, nonspecific anterior T wave changes     EKG:  My personal interpretation of the EKG dated 7/5/2020 is sinus, 59     Echocardiogram: None     Cardiac Catheterization: None     Imaging  Chest X-Ray: 7/6/2020  No acute cardiopulmonary process    Treadmill stress echocardiogram 7/7/2020  Negative stress echocardiogram for ischemia with adequate stress   achieved by heart rate criteria.   EF 70%  Resting blood pressure 140/76, peak blood pressure 182/66     Stress Test: 7/6/2020  Positive stress treadmill ECG for ischemia.   II, III, aVF 1 mm upsloping ST depression starting at stage 2 resolved    with recovery. V3-V6 0.5 mm Upsloping ST depression at stage 3 resolved    with recovery. aVR 1 mm ST elevation starting stage 3 of exercise resolved    with recovery.    Insufficient BP increase with prolonged exercise. >20mmHg drop from stage    1 to stage 3 of exercise.    No sustained arrhythmias.   [facility] treadmill score +1; intermediate risk.    Most recent labs:       Lab Results   Component Value Date/Time    HEMOGLOBIN 14.7 07/05/2020 09:38 AM    HEMATOCRIT 45.2 07/05/2020 09:38 AM    MCV 91.7 07/05/2020 09:38 AM      Lab Results   Component Value Date/Time    SODIUM 139 07/05/2020 09:38 AM    POTASSIUM 3.9 07/05/2020 09:38 AM    CHLORIDE 105 07/05/2020 09:38 AM    CO2 23 07/05/2020 09:38 AM    GLUCOSE 206 (H) 07/05/2020 09:38 AM    BUN  12 07/05/2020 09:38 AM    CREATININE 0.69 07/05/2020 09:38 AM      Lab Results   Component Value Date/Time    ASTSGOT 20 07/05/2020 09:38 AM    ALTSGPT 15 07/05/2020 09:38 AM    ALBUMIN 4.4 07/05/2020 09:38 AM      Lab Results   Component Value Date/Time    CHOLSTRLTOT 225 (H) 07/06/2020 07:28 AM     (H) 07/06/2020 07:28 AM    HDL 73 07/06/2020 07:28 AM    TRIGLYCERIDE 140 07/06/2020 07:28 AM           Past Medical History:   Diagnosis Date   • Asthma    • Breast cancer (HCC)     left   • Glenoid labrum tear 11/7/2011   • HTN (hypertension) 10/3/2011   • HX: breast cancer 10/3/2011    left, mastectomy   • Hyperlipidemia 10/3/2011   • Tendonitis of shoulder 11/7/2011   • Type II or unspecified type diabetes mellitus without mention of complication, not stated as uncontrolled 10/3/2011     Past Surgical History:   Procedure Laterality Date   • MASTECTOMY  2002   • BREAST IMPLANT REMOVAL     • BREAST RECONSTRUCTION     • DILATION AND CURETTAGE     • MASTECTOMY MODIFIED RADICAL      left     Family History   Problem Relation Age of Onset   • Cancer Mother         pancreatic   • Stroke Mother    • Cancer Father         prostate cancer   • Lung Disease Father         emphysema   • Arthritis Daughter         Ank. Spond   • Cancer Maternal Aunt         breast cancer     Social History     Socioeconomic History   • Marital status:      Spouse name: Not on file   • Number of children: Not on file   • Years of education: Not on file   • Highest education level: Not on file   Occupational History   • Occupation: management in past     Employer: OTHER   Social Needs   • Financial resource strain: Not on file   • Food insecurity     Worry: Not on file     Inability: Not on file   • Transportation needs     Medical: Not on file     Non-medical: Not on file   Tobacco Use   • Smoking status: Never Smoker   • Smokeless tobacco: Never Used   Substance and Sexual Activity   • Alcohol use: No     Comment: Rare   • Drug  "use: No   • Sexual activity: Yes     Partners: Male   Lifestyle   • Physical activity     Days per week: Not on file     Minutes per session: Not on file   • Stress: Not on file   Relationships   • Social connections     Talks on phone: Not on file     Gets together: Not on file     Attends Cheondo[employer] service: Not on file     Active member of club or organization: Not on file     Attends meetings of clubs or organizations: Not on file     Relationship status: Not on file   • Intimate partner violence     Fear of current or ex partner: Not on file     Emotionally abused: Not on file     Physically abused: Not on file     Forced sexual activity: Not on file   Other Topics Concern   • Not on file   Social History Narrative     30+ years.     Born [facility].    Brother doctor in [place].     Sister.     Son is internist in [place].      Allergies   Allergen Reactions   • Asa [Aspirin]      As child in [facility]- had bulging eyes. Tolerates asa 81 mg.        Current Outpatient Medications   Medication Sig Dispense Refill   • atorvastatin (LIPITOR) 40 MG Tab Take 1 Tab by mouth every evening. 30 Tab 2   • Cyanocobalamin (VITAMIN B-12 PO) Take 1 Tab by mouth every day.     • therapeutic multivitamin-minerals (THERAGRAN-M) Tab Take 1 Tab by mouth every day.     • Calcium Carb-Cholecalciferol (CALCIUM 600+D3 PO) Take 1 Tab by mouth every morning.     • enalapril (VASOTEC) 10 MG Tab Take 1 Tab by mouth every day. 90 Tab 1   • aspirin EC (ECOTRIN) 81 MG TBEC Take 81 mg by mouth every bedtime.       No current facility-administered medications for this visit.        ROS  All others systems reviewed and negative.     Objective:     /72 (BP Location: Right arm, Patient Position: Sitting, BP Cuff Size: Adult)   Pulse 82   Resp 14   Ht 1.676 m (5' 6\")   Wt 65.9 kg (145 lb 2.8 oz)   SpO2 93%  Body mass index is 23.43 kg/m².    General: No acute distress. Well nourished.  HEENT: EOM grossly intact, no scleral " icterus, no pharyngeal erythema.   Neck:  No JVD, no bruits, trachea midline  CVS: RRR. Normal S1, S2. No M/R/G. No LE edema.  2+ radial pulses, 2+ PT pulses  Resp: CTAB. No wheezing or crackles/rhonchi. Normal respiratory effort.  Abdomen: Soft, NT, no whitney hepatomegaly.  MSK/Ext: No clubbing or cyanosis.  Skin: Warm and dry, no rashes.  Neurological: CN III-XII grossly intact. No focal deficits.   Psych: A&O x 3, appropriate affect, good judgement      Assessment:     1. Other chest pain     2. Essential hypertension     3. DM type 2 without retinopathy (HCC)     4. Pure hypercholesterolemia     5. Diabetes mellitus type 2 in nonobese (HCC)     6. Abnormal stress ECG with treadmill         Medical Decision Making:  Today's Assessment / Status / Plan:     -Atypical chest pain with normal troponin  -Abnormal stress test followed by normal stress echo  -Type 2 diabetes, diet only  -Mixed hyperlipidemia, not yet to goal, statin just increased, LDL goal 100. FU  By PCP.  -Family history of coronary disease, brother  -Cont primary prevention statin, ASA.  -Cont exercise  -Her with her  who has a PM and sees Dr. Fitzgerald.  -RTC 1 year       Return in about 1 year (around 7/14/2021).    It is my pleasure to participate in the care of Ms. Garcia.  Please do not hesitate to contact me with questions or concerns.    Shana Mccann MD, [facility]  Cardiologist [facility]    Please note that this dictation was created using voice recognition software. I have made every reasonable attempt to correct obvious errors, but it is possible there are errors of grammar and possibly content that I did not discover before finalizing the note.      Alanis Reece M.D.  [address] [address]  VIA Facsimile: [phone]               no numbness, no tingling

## 2020-07-14 NOTE — PROGRESS NOTES
Subjective:   Chief Complaint:   Chief Complaint   Patient presents with   • Chest Pain       Hilda Garcia is a 65 y.o. female who returns today after recent hospital admission for chest pain.    She presented 7/5/2020 with chest pain.  She had been face timing with her grandchildren.  She noted sharp stabbing pain in the left chest with radiating dull pain to her left shoulder blade and down her left side.  The stabbing chest pain only lasted 5 minutes, the dull aching chest pain lasted for over an hour.    She took an aspirin at home and was given more aspirin in the emergency room.    She never took nitroglycerin.     Presenting ECG with nonspecific T wave changes.  High-sensitivity troponin less than 6, less than 6, 9.  Was admitted the hospital underwent a treadmill stress test.  Had significant ST changes which were upsloping.  Noted to have drop in blood pressure during stress.  We repeated a treadmill stress echo, she exercised well, had normal blood pressure response to exercise and had normal pre-and post stress images and was discharged home.     Has diabetes, previously on medications but controlled with diet and exercise alone the past 2 years  Has hyperlipidemia, LDL cholesterol 124 despite simvastatin.  Has hypertension, on medication, blood pressures been essentially normal here with the exception of slightly elevated in the emergency room.  Brother had MI with stent in his mid to late 60s.  No prior smoking history.  No history of autoimmune disease such as lupus or rheumatoid arthritis.  No chronic kidney disease.  No ETOH overuse.  No caffeine overuse.  Prior breast cancer but surgery only, no chemo or radiation, around 2001.     She walks 2 to 5 miles daily with her .  She has not had CP. Did have shoulder pain, reproducible on exam.  She is not limited by chest pain, pressure or tightness with activity.   No significant dyspnea on exertion, orthopnea or lower extremity swelling.   No  significant palpitations, lightheadedness, or presyncope/syncope.   No symptoms of leg claudication.   No stroke/TIA like symptoms.     Her son is Dr. Zenon Garcia, a [facility]ist at [facility] and with the rounding service at [facility].  She is accompanied by her .    DATA REVIEWED by me:  ECG 7/5/2020  Sinus, 65, nonspecific anterior T wave changes     EKG:  My personal interpretation of the EKG dated 7/5/2020 is sinus, 59     Echocardiogram: None     Cardiac Catheterization: None     Imaging  Chest X-Ray: 7/6/2020  No acute cardiopulmonary process    Treadmill stress echocardiogram 7/7/2020  Negative stress echocardiogram for ischemia with adequate stress   achieved by heart rate criteria.   EF 70%  Resting blood pressure 140/76, peak blood pressure 182/66     Stress Test: 7/6/2020  Positive stress treadmill ECG for ischemia.   II, III, aVF 1 mm upsloping ST depression starting at stage 2 resolved    with recovery. V3-V6 0.5 mm Upsloping ST depression at stage 3 resolved    with recovery. aVR 1 mm ST elevation starting stage 3 of exercise resolved    with recovery.    Insufficient BP increase with prolonged exercise. >20mmHg drop from stage    1 to stage 3 of exercise.    No sustained arrhythmias.   [facility] treadmill score +1; intermediate risk.    Most recent labs:       Lab Results   Component Value Date/Time    HEMOGLOBIN 14.7 07/05/2020 09:38 AM    HEMATOCRIT 45.2 07/05/2020 09:38 AM    MCV 91.7 07/05/2020 09:38 AM      Lab Results   Component Value Date/Time    SODIUM 139 07/05/2020 09:38 AM    POTASSIUM 3.9 07/05/2020 09:38 AM    CHLORIDE 105 07/05/2020 09:38 AM    CO2 23 07/05/2020 09:38 AM    GLUCOSE 206 (H) 07/05/2020 09:38 AM    BUN 12 07/05/2020 09:38 AM    CREATININE 0.69 07/05/2020 09:38 AM      Lab Results   Component Value Date/Time    ASTSGOT 20 07/05/2020 09:38 AM    ALTSGPT 15 07/05/2020 09:38 AM    ALBUMIN 4.4 07/05/2020 09:38 AM      Lab Results   Component Value Date/Time    CHOLSTRLTOT 225 (H)  07/06/2020 07:28 AM     (H) 07/06/2020 07:28 AM    HDL 73 07/06/2020 07:28 AM    TRIGLYCERIDE 140 07/06/2020 07:28 AM           Past Medical History:   Diagnosis Date   • Asthma    • Breast cancer (HCC)     left   • Glenoid labrum tear 11/7/2011   • HTN (hypertension) 10/3/2011   • HX: breast cancer 10/3/2011    left, mastectomy   • Hyperlipidemia 10/3/2011   • Tendonitis of shoulder 11/7/2011   • Type II or unspecified type diabetes mellitus without mention of complication, not stated as uncontrolled 10/3/2011     Past Surgical History:   Procedure Laterality Date   • MASTECTOMY  2002   • BREAST IMPLANT REMOVAL     • BREAST RECONSTRUCTION     • DILATION AND CURETTAGE     • MASTECTOMY MODIFIED RADICAL      left     Family History   Problem Relation Age of Onset   • Cancer Mother         pancreatic   • Stroke Mother    • Cancer Father         prostate cancer   • Lung Disease Father         emphysema   • Arthritis Daughter         Ank. Spond   • Cancer Maternal Aunt         breast cancer     Social History     Socioeconomic History   • Marital status:      Spouse name: Not on file   • Number of children: Not on file   • Years of education: Not on file   • Highest education level: Not on file   Occupational History   • Occupation: management in past     Employer: OTHER   Social Needs   • Financial resource strain: Not on file   • Food insecurity     Worry: Not on file     Inability: Not on file   • Transportation needs     Medical: Not on file     Non-medical: Not on file   Tobacco Use   • Smoking status: Never Smoker   • Smokeless tobacco: Never Used   Substance and Sexual Activity   • Alcohol use: No     Comment: Rare   • Drug use: No   • Sexual activity: Yes     Partners: Male   Lifestyle   • Physical activity     Days per week: Not on file     Minutes per session: Not on file   • Stress: Not on file   Relationships   • Social connections     Talks on phone: Not on file     Gets together: Not on file  "    Attends Jehovah's witness service: Not on file     Active member of club or organization: Not on file     Attends meetings of clubs or organizations: Not on file     Relationship status: Not on file   • Intimate partner violence     Fear of current or ex partner: Not on file     Emotionally abused: Not on file     Physically abused: Not on file     Forced sexual activity: Not on file   Other Topics Concern   • Not on file   Social History Narrative     30+ years.     Born [facility].    Brother doctor in [place].     Sister.     Son is internist in [place].      Allergies   Allergen Reactions   • Asa [Aspirin]      As child in [facility]- had bulging eyes. Tolerates asa 81 mg.        Current Outpatient Medications   Medication Sig Dispense Refill   • atorvastatin (LIPITOR) 40 MG Tab Take 1 Tab by mouth every evening. 30 Tab 2   • Cyanocobalamin (VITAMIN B-12 PO) Take 1 Tab by mouth every day.     • therapeutic multivitamin-minerals (THERAGRAN-M) Tab Take 1 Tab by mouth every day.     • Calcium Carb-Cholecalciferol (CALCIUM 600+D3 PO) Take 1 Tab by mouth every morning.     • enalapril (VASOTEC) 10 MG Tab Take 1 Tab by mouth every day. 90 Tab 1   • aspirin EC (ECOTRIN) 81 MG TBEC Take 81 mg by mouth every bedtime.       No current facility-administered medications for this visit.        ROS  All others systems reviewed and negative.     Objective:     /72 (BP Location: Right arm, Patient Position: Sitting, BP Cuff Size: Adult)   Pulse 82   Resp 14   Ht 1.676 m (5' 6\")   Wt 65.9 kg (145 lb 2.8 oz)   SpO2 93%  Body mass index is 23.43 kg/m².    General: No acute distress. Well nourished.  HEENT: EOM grossly intact, no scleral icterus, no pharyngeal erythema.   Neck:  No JVD, no bruits, trachea midline  CVS: RRR. Normal S1, S2. No M/R/G. No LE edema.  2+ radial pulses, 2+ PT pulses  Resp: CTAB. No wheezing or crackles/rhonchi. Normal respiratory effort.  Abdomen: Soft, NT, no whitney hepatomegaly.  MSK/Ext: No " clubbing or cyanosis.  Skin: Warm and dry, no rashes.  Neurological: CN III-XII grossly intact. No focal deficits.   Psych: A&O x 3, appropriate affect, good judgement      Assessment:     1. Other chest pain     2. Essential hypertension     3. DM type 2 without retinopathy (HCC)     4. Pure hypercholesterolemia     5. Diabetes mellitus type 2 in nonobese (HCC)     6. Abnormal stress ECG with treadmill         Medical Decision Making:  Today's Assessment / Status / Plan:     -Atypical chest pain with normal troponin  -Abnormal stress test followed by normal stress echo  -Type 2 diabetes, diet only  -Mixed hyperlipidemia, not yet to goal, statin just increased, LDL goal 100. FU  By PCP.  -Family history of coronary disease, brother  -Cont primary prevention statin, ASA.  -Cont exercise  -Her with her  who has a PM and sees Dr. Fitzgerald.  -RTC 1 year       Return in about 1 year (around 7/14/2021).    It is my pleasure to participate in the care of Ms. Garcia.  Please do not hesitate to contact me with questions or concerns.    Shana Mccann MD, [facility]  Cardiologist [facility]    Please note that this dictation was created using voice recognition software. I have made every reasonable attempt to correct obvious errors, but it is possible there are errors of grammar and possibly content that I did not discover before finalizing the note.

## 2020-12-05 ENCOUNTER — HOSPITAL ENCOUNTER (EMERGENCY)
Facility: MEDICAL CENTER | Age: 65
End: 2020-12-05
Attending: EMERGENCY MEDICINE
Payer: MEDICARE

## 2020-12-05 VITALS
HEIGHT: 66 IN | HEART RATE: 79 BPM | RESPIRATION RATE: 20 BRPM | BODY MASS INDEX: 23.74 KG/M2 | TEMPERATURE: 97.8 F | OXYGEN SATURATION: 97 % | WEIGHT: 147.71 LBS | SYSTOLIC BLOOD PRESSURE: 127 MMHG | DIASTOLIC BLOOD PRESSURE: 79 MMHG

## 2020-12-05 DIAGNOSIS — M79.602 PAIN OF LEFT UPPER EXTREMITY: ICD-10-CM

## 2020-12-05 PROCEDURE — 99281 EMR DPT VST MAYX REQ PHY/QHP: CPT

## 2020-12-05 RX ORDER — DOCUSATE SODIUM 100 MG/1
300 CAPSULE, LIQUID FILLED ORAL DAILY
COMMUNITY

## 2020-12-05 RX ORDER — ATORVASTATIN CALCIUM 80 MG/1
80 TABLET, FILM COATED ORAL NIGHTLY
Status: SHIPPED | COMMUNITY
End: 2023-02-14 | Stop reason: SDUPTHER

## 2020-12-05 ASSESSMENT — PAIN DESCRIPTION - DESCRIPTORS: DESCRIPTORS: ACHING

## 2020-12-05 ASSESSMENT — FIBROSIS 4 INDEX: FIB4 SCORE: 1.33

## 2020-12-05 NOTE — ED PROVIDER NOTES
ED Provider Note    CHIEF COMPLAINT  Chief Complaint   Patient presents with   • Arm Pain       HPI  Hilda Graham YANI Hui is a 65 y.o. female who presents with a past medical history is here for left breast mastectomy, breast cancer in remission, tendinitis, non-insulin-dependent diabetes, she presents with posterior neck, shoulder, and arm pain that began at 8 AM this morning.  It came on while she was talking on the phone.  It has resolved, the pain is gone.  She has never had pain like this before.  The pain was not exertional, she denied shortness of breath or diaphoresis.  She denies any weakness of the extremity, she denies any numbness of the extremity, she says the symptoms have resolved.    REVIEW OF SYSTEMS  See HPI for further details. All other systems are negative.     PAST MEDICAL HISTORY   has a past medical history of Asthma, Breast cancer (HCC), Glenoid labrum tear (11/7/2011), HTN (hypertension) (10/3/2011), breast cancer (10/3/2011), Hyperlipidemia (10/3/2011), Tendonitis of shoulder (11/7/2011), and Type II or unspecified type diabetes mellitus without mention of complication, not stated as uncontrolled (10/3/2011).    SOCIAL HISTORY  Social History     Tobacco Use   • Smoking status: Never Smoker   • Smokeless tobacco: Never Used   Substance and Sexual Activity   • Alcohol use: No     Comment: Rarely   • Drug use: No   • Sexual activity: Yes     Partners: Male       SURGICAL HISTORY   has a past surgical history that includes mastectomy modified radical; breast reconstruction; breast implant removal; dilation and curettage; and mastectomy (2002).    CURRENT MEDICATIONS  Home Medications    **Home medications have not yet been reviewed for this encounter**         ALLERGIES  Allergies   Allergen Reactions   • Asa [Aspirin]      As child in [facility]- had bulging eyes. Tolerates asa 81 mg.        PHYSICAL EXAM  VITAL SIGNS: /79   Pulse 79   Temp 36.6 °C (97.8 °F) (Temporal)   " Resp 20   Ht 1.676 m (5' 6\")   Wt 67 kg (147 lb 11.3 oz)   LMP 03/15/2005   SpO2 97%   BMI 23.84 kg/m²  @FLAVIO[[id number]::@   Pulse ox interpretation: I interpret this pulse ox as normal.  Constitutional: Alert in no apparent distress.  HENT: No signs of trauma, Bilateral external ears normal, Nose normal.   Eyes: Pupils are equal and reactive, Conjunctiva normal, Non-icteric.   Neck: Normal range of motion, No tenderness, Supple, No stridor.   Lymphatic: No lymphadenopathy noted.   Cardiovascular: Regular rate and rhythm, no murmurs.   Thorax & Lungs: Normal breath sounds, No respiratory distress, No wheezing, No chest tenderness.   Abdomen: Bowel sounds normal, Soft, No tenderness, No masses, No pulsatile masses. No peritoneal signs.  Skin: Warm, Dry, No erythema, No rash.   Back: No bony tenderness, No CVA tenderness.   Extremities: Intact distal pulses, No edema, No tenderness, No cyanosis.  Musculoskeletal: Good range of motion in all major joints.  The patient has tenderness over the left trapezius in the distribution of where she said the pain had occurred earlier.  Neurologic: Alert , Normal motor function, Normal sensory function, No focal deficits noted.  No evidence of spinal cord compression.  Psychiatric: Affect normal, Judgment normal, Mood normal.             COURSE & MEDICAL DECISION MAKING  Pertinent Labs & Imaging studies reviewed. (See chart for details)    The patient symptoms have resolved, she had no chest pain or shortness of breath.  She has reproducible musculoskeletal pain of the left trapezius area.  She has no evidence of spinal cord compression currently.  The patient's cause of her pain most likely is musculoskeletal.    I used full protective gear while I was in the patient's room including N 95 mask, goggles, gloves, gown. I limited my exposure time in the room. The brief amount of time I was in the room I was  by at least 6 feet except for a brief directed physical " examination. My equipment was wiped down, hands washed before and after the encounter.         At this time I would like the patient to use Aleve and Tylenol for her pain, to follow-up with her doctor if the symptoms do not resolve and to return to the ER if she has chest pain, diaphoresis, nausea, shortness of breath.       The patient will return for new or worsening symptoms and is stable at the time of discharge.    The patient is referred to a primary physician for blood pressure management, diabetic screening, and for all other preventative health concerns.        DISPOSITION:  Patient will be discharged home in stable condition.    FOLLOW UP:  [facility], Emergency Dept  [address]  [place] [id number] [phone]    If symptoms worsen    Alanis Reece M.D.  [address] [place] [id number] [phone]      call for follow up      OUTPATIENT MEDICATIONS:  New Prescriptions    No medications on file         The patient will return for worsening symptoms and is stable at the time of discharge. The patient verbalizes understanding and will comply.    FINAL IMPRESSION  1. Pain of left upper extremity                Electronically signed by: Brian Sheehan M.D., 12/5/2020 10:21 AM

## 2020-12-05 NOTE — ED TRIAGE NOTES
"Presents complaining of left arm pain recurring since earlier this AM. She denies any chest pain or dyspnea.  She does not describe any recent traumatic events.  Chief Complaint   Patient presents with   • Arm Pain     /79   Pulse 79   Temp 36.6 °C (97.8 °F) (Temporal)   Resp 20   Ht 1.676 m (5' 6\")   Wt 67 kg (147 lb 11.3 oz)   LMP 03/15/2005   SpO2 97%   BMI 23.84 kg/m²      "

## 2020-12-05 NOTE — DISCHARGE INSTRUCTIONS
Pain Without a Known Cause  Pain can occur in any part of the body and can range from mild to severe. Sometimes no cause can be found for why you are having pain. Some types of pain that can occur without a known cause include:  · Headache.  · Back pain.  · Abdominal pain.  · Neck pain.  Your health care provider will do tests to try to find the cause of your pain. If no cause is found, your health care provider may diagnose you with pain without a known cause. In some cases, your health care provider may repeat tests and look further for a possible cause.  Follow these instructions at home:  Managing pain, stiffness, and swelling         · Take over-the-counter and prescription medicines only as told by your health care provider.  · Do not drive or use heavy machinery while taking prescription pain medicine.  · Stop any activities that cause pain. Rest during periods of severe pain.  · If directed, put ice on the painful area:  ? Put ice in a plastic bag.  ? Place a towel between your skin and the bag.  ? Leave the ice on for 20 minutes, 2-3 times a day.  · If directed, apply heat to the affected area. Use the heat source that your health care provider recommends, such as a moist heat pack or a heating pad.  ? Place a towel between your skin and the heat source.  ? Leave the heat on for 20-30 minutes.  ? Remove the heat if your skin turns bright red. This is especially important if you are unable to feel pain, heat, or cold. You may have a greater risk of getting burned.  General instructions    · Reduce your stress with activities such as yoga or meditation. Talk with your health care provider about other ways to reduce stress.  · Exercise regularly. Ask your health care provider what activities are safe for you.  · Eat a balanced diet that includes fruits and vegetables, whole grains, lean meat, and low-fat dairy. Talk with your health care provider if you have any questions about your diet.  · If you are taking  prescription pain medicine, take actions to prevent or treat constipation. Your health care provider may recommend that you:  ? Drink enough fluid to keep your urine pale yellow.  ? Eat foods that are high in fiber, such as fresh fruits and vegetables, whole grains, and beans.  ? Limit foods that are high in fat and processed sugars, such as fried and sweet foods.  ? Take an over-the-counter or prescription medicine for constipation.  Contact a health care provider if you:  · Have pain, and no reason can be found for it.  · Do not get better, even after treatment.  Get help right away if:  · Your pain is making you want to harm yourself.  If you ever feel like you may hurt yourself or others, or have thoughts about taking your own life, get help right away. You can go to your nearest emergency department or call:  · Your local emergency services (911 in the [place]  · A suicide crisis helpline, such as the National Suicide Prevention Lifeline at 1-334.906.7313. This is open 24 hours a day.  Summary  · Pain can occur in any part of the body and can range from mild to severe.  · Your health care provider will do tests to try to find the cause of your pain. If no cause is found, your health care provider may diagnose you with pain without a known cause.  · To help your pain, take medicines as told by your health care provider, apply ice or heat, exercise, reduce stress, and eat a healthy diet.  This information is not intended to replace advice given to you by your health care provider. Make sure you discuss any questions you have with your health care provider.  Document Released: 09/12/2002 Document Revised: 02/13/2020 Document Reviewed: 01/07/2019  Elsevier Patient Education © 2020 [employer]

## 2020-12-05 NOTE — ED NOTES
Reviewed discharge instructions w/ pt, verbalized understanding to information provided including follow up care and return precautions, denied questions/concerns.  Pt ambulated from ED w/ family member.

## 2021-03-03 DIAGNOSIS — Z23 NEED FOR VACCINATION: ICD-10-CM

## 2021-03-11 ENCOUNTER — IMMUNIZATION (OUTPATIENT)
Dept: FAMILY PLANNING/WOMEN'S HEALTH CLINIC | Facility: IMMUNIZATION CENTER | Age: 66
End: 2021-03-11
Attending: INTERNAL MEDICINE
Payer: MEDICARE

## 2021-03-11 DIAGNOSIS — Z23 ENCOUNTER FOR VACCINATION: Primary | ICD-10-CM

## 2021-03-11 DIAGNOSIS — Z23 NEED FOR VACCINATION: ICD-10-CM

## 2021-03-11 PROCEDURE — 0001A PFIZER SARS-COV-2 VACCINE: CPT

## 2021-03-11 PROCEDURE — 91300 PFIZER SARS-COV-2 VACCINE: CPT

## 2021-04-15 ENCOUNTER — IMMUNIZATION (OUTPATIENT)
Dept: FAMILY PLANNING/WOMEN'S HEALTH CLINIC | Facility: IMMUNIZATION CENTER | Age: 66
End: 2021-04-15
Payer: MEDICARE

## 2021-04-15 DIAGNOSIS — Z23 ENCOUNTER FOR VACCINATION: Primary | ICD-10-CM

## 2021-04-15 PROCEDURE — 0002A PFIZER SARS-COV-2 VACCINE: CPT

## 2021-04-15 PROCEDURE — 91300 PFIZER SARS-COV-2 VACCINE: CPT

## 2021-12-14 ENCOUNTER — HOSPITAL ENCOUNTER (OUTPATIENT)
Dept: RADIOLOGY | Facility: MEDICAL CENTER | Age: 66
End: 2021-12-14
Attending: FAMILY MEDICINE
Payer: MEDICARE

## 2021-12-14 DIAGNOSIS — Z12.31 VISIT FOR SCREENING MAMMOGRAM: ICD-10-CM

## 2021-12-14 PROCEDURE — 77063 BREAST TOMOSYNTHESIS BI: CPT

## 2021-12-17 ENCOUNTER — HOSPITAL ENCOUNTER (OUTPATIENT)
Dept: RADIOLOGY | Facility: MEDICAL CENTER | Age: 66
End: 2021-12-17
Attending: FAMILY MEDICINE
Payer: MEDICARE

## 2022-01-11 ENCOUNTER — HOSPITAL ENCOUNTER (OUTPATIENT)
Dept: LAB | Facility: MEDICAL CENTER | Age: 67
End: 2022-01-11
Attending: FAMILY MEDICINE
Payer: MEDICARE

## 2022-01-11 LAB
ALBUMIN SERPL BCP-MCNC: 4.6 G/DL (ref 3.2–4.9)
ALBUMIN/GLOB SERPL: 1.8 G/DL
ALP SERPL-CCNC: 84 U/L (ref 30–99)
ALT SERPL-CCNC: 21 U/L (ref 2–50)
ANION GAP SERPL CALC-SCNC: 8 MMOL/L (ref 7–16)
AST SERPL-CCNC: 23 U/L (ref 12–45)
BILIRUB SERPL-MCNC: 0.6 MG/DL (ref 0.1–1.5)
BUN SERPL-MCNC: 13 MG/DL (ref 8–22)
CALCIUM SERPL-MCNC: 9.2 MG/DL (ref 8.5–10.5)
CHLORIDE SERPL-SCNC: 105 MMOL/L (ref 96–112)
CHOLEST SERPL-MCNC: 254 MG/DL (ref 100–199)
CO2 SERPL-SCNC: 26 MMOL/L (ref 20–33)
CREAT SERPL-MCNC: 0.64 MG/DL (ref 0.5–1.4)
CREAT UR-MCNC: 61.9 MG/DL
EST. AVERAGE GLUCOSE BLD GHB EST-MCNC: 131 MG/DL
FASTING STATUS PATIENT QL REPORTED: NORMAL
GLOBULIN SER CALC-MCNC: 2.6 G/DL (ref 1.9–3.5)
GLUCOSE SERPL-MCNC: 124 MG/DL (ref 65–99)
HBA1C MFR BLD: 6.2 % (ref 4–5.6)
HDLC SERPL-MCNC: 82 MG/DL
LDLC SERPL CALC-MCNC: 151 MG/DL
MICROALBUMIN UR-MCNC: <1.2 MG/DL
MICROALBUMIN/CREAT UR: NORMAL MG/G (ref 0–30)
POTASSIUM SERPL-SCNC: 4.4 MMOL/L (ref 3.6–5.5)
PROT SERPL-MCNC: 7.2 G/DL (ref 6–8.2)
SODIUM SERPL-SCNC: 139 MMOL/L (ref 135–145)
TRIGL SERPL-MCNC: 106 MG/DL (ref 0–149)

## 2022-01-11 PROCEDURE — 80061 LIPID PANEL: CPT

## 2022-01-11 PROCEDURE — 82043 UR ALBUMIN QUANTITATIVE: CPT

## 2022-01-11 PROCEDURE — 80053 COMPREHEN METABOLIC PANEL: CPT

## 2022-01-11 PROCEDURE — 82570 ASSAY OF URINE CREATININE: CPT

## 2022-01-11 PROCEDURE — 83036 HEMOGLOBIN GLYCOSYLATED A1C: CPT

## 2022-01-11 PROCEDURE — 36415 COLL VENOUS BLD VENIPUNCTURE: CPT

## 2022-06-07 ENCOUNTER — HOSPITAL ENCOUNTER (OUTPATIENT)
Dept: RADIOLOGY | Facility: MEDICAL CENTER | Age: 67
End: 2022-06-07
Attending: FAMILY MEDICINE
Payer: MEDICARE

## 2022-06-07 DIAGNOSIS — R05.9 COUGH: ICD-10-CM

## 2022-06-07 PROCEDURE — 71046 X-RAY EXAM CHEST 2 VIEWS: CPT

## 2022-10-21 ENCOUNTER — TELEPHONE (OUTPATIENT)
Dept: SCHEDULING | Facility: IMAGING CENTER | Age: 67
End: 2022-10-21

## 2022-11-07 ENCOUNTER — PATIENT MESSAGE (OUTPATIENT)
Dept: HEALTH INFORMATION MANAGEMENT | Facility: OTHER | Age: 67
End: 2022-11-07

## 2022-11-21 NOTE — PROGRESS NOTES
Telemetry Shift Summary     Rhythm SB/SR  HR Range 50 - 60  Ectopy R PVC  Measurements 0.20 / 0.08 / 0.42           Normal Values  Rhythm SR  HR Range    Measurements 0.12-0.20 / 0.06-0.10  / 0.30-0.52     Aklief Pregnancy And Lactation Text: It is unknown if this medication is safe to use during pregnancy.  It is unknown if this medication is excreted in breast milk.  Breastfeeding women should use the topical cream on the smallest area of the skin for the shortest time needed while breastfeeding.  Do not apply to nipple and areola.

## 2022-12-01 ENCOUNTER — TELEPHONE (OUTPATIENT)
Dept: SCHEDULING | Facility: IMAGING CENTER | Age: 67
End: 2022-12-01

## 2022-12-20 ENCOUNTER — APPOINTMENT (OUTPATIENT)
Dept: RADIOLOGY | Facility: MEDICAL CENTER | Age: 67
End: 2022-12-20
Attending: FAMILY MEDICINE
Payer: MEDICARE

## 2022-12-20 DIAGNOSIS — Z12.31 VISIT FOR SCREENING MAMMOGRAM: ICD-10-CM

## 2022-12-20 PROCEDURE — 77067 SCR MAMMO BI INCL CAD: CPT

## 2023-01-27 ENCOUNTER — APPOINTMENT (OUTPATIENT)
Dept: MEDICAL GROUP | Facility: MEDICAL CENTER | Age: 68
End: 2023-01-27
Payer: MEDICARE

## 2023-02-14 ENCOUNTER — OFFICE VISIT (OUTPATIENT)
Dept: MEDICAL GROUP | Facility: MEDICAL CENTER | Age: 68
End: 2023-02-14
Payer: MEDICARE

## 2023-02-14 VITALS
TEMPERATURE: 98 F | SYSTOLIC BLOOD PRESSURE: 122 MMHG | BODY MASS INDEX: 23.32 KG/M2 | HEIGHT: 65 IN | RESPIRATION RATE: 16 BRPM | WEIGHT: 140 LBS | HEART RATE: 79 BPM | DIASTOLIC BLOOD PRESSURE: 72 MMHG | OXYGEN SATURATION: 94 %

## 2023-02-14 DIAGNOSIS — Z11.59 NEED FOR HEPATITIS C SCREENING TEST: ICD-10-CM

## 2023-02-14 DIAGNOSIS — I10 ESSENTIAL HYPERTENSION: ICD-10-CM

## 2023-02-14 DIAGNOSIS — E78.00 PURE HYPERCHOLESTEROLEMIA: ICD-10-CM

## 2023-02-14 DIAGNOSIS — R06.83 SNORING: ICD-10-CM

## 2023-02-14 DIAGNOSIS — R73.03 PREDIABETES: ICD-10-CM

## 2023-02-14 DIAGNOSIS — Z85.3 HISTORY OF BREAST CANCER: ICD-10-CM

## 2023-02-14 PROBLEM — R07.9 CHEST PAIN: Status: RESOLVED | Noted: 2020-07-05 | Resolved: 2023-02-14

## 2023-02-14 PROCEDURE — 99204 OFFICE O/P NEW MOD 45 MIN: CPT | Performed by: INTERNAL MEDICINE

## 2023-02-14 RX ORDER — ENALAPRIL MALEATE 10 MG/1
10 TABLET ORAL DAILY
Qty: 90 TABLET | Refills: 3 | Status: SHIPPED | OUTPATIENT
Start: 2023-02-14 | End: 2024-01-05

## 2023-02-14 RX ORDER — COVID-19 MOLECULAR TEST ASSAY
KIT MISCELLANEOUS
COMMUNITY
Start: 2022-11-28 | End: 2023-02-14

## 2023-02-14 RX ORDER — ATORVASTATIN CALCIUM 80 MG/1
80 TABLET, FILM COATED ORAL NIGHTLY
Qty: 90 TABLET | Refills: 3 | Status: SHIPPED | OUTPATIENT
Start: 2023-02-14 | End: 2024-01-05

## 2023-02-14 RX ORDER — BLOOD SUGAR DIAGNOSTIC
STRIP MISCELLANEOUS
COMMUNITY
Start: 2022-12-11 | End: 2023-02-14 | Stop reason: SDUPTHER

## 2023-02-14 RX ORDER — BLOOD SUGAR DIAGNOSTIC
STRIP MISCELLANEOUS
Qty: 100 STRIP | Refills: 3 | Status: SHIPPED | OUTPATIENT
Start: 2023-02-14

## 2023-02-14 ASSESSMENT — PATIENT HEALTH QUESTIONNAIRE - PHQ9: CLINICAL INTERPRETATION OF PHQ2 SCORE: 0

## 2023-02-14 NOTE — PROGRESS NOTES
New Patient to Establish      CC: Review of chronic medical problems    HPI:   Hilda is a 67 y.o. female who came into clinic for above.    No acute health concerns.    She has been on enalapril for high blood pressure which is stable.    She takes atorvastatin 80 mg daily for high cholesterol.    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  Did not require radiation or Chemo. No Tamoxifen.     ROS:   As above    Patient Active Problem List    Diagnosis Date Noted    Prediabetes 07/05/2020    History of breast cancer 10/03/2011    Primary hypertension 10/03/2011    HTN (hypertension) 10/03/2011    Hyperlipidemia 10/03/2011       Past Medical History:   Diagnosis Date    Asthma     Breast cancer (HCC)     left    Glenoid labrum tear 11/7/2011    HTN (hypertension) 10/3/2011    HX: breast cancer 10/3/2011    left, mastectomy    Hyperlipidemia 10/3/2011    Tendonitis of shoulder 11/7/2011    Type II or unspecified type diabetes mellitus without mention of complication, not stated as uncontrolled 10/3/2011       Current Outpatient Medications   Medication Sig Dispense Refill    enalapril (VASOTEC) 10 MG Tab Take 1 Tablet by mouth every day. 90 Tablet 3    atorvastatin (LIPITOR) 80 MG tablet Take 1 Tablet by mouth every evening. 90 Tablet 3    ONETOUCH ULTRA strip USE TO TEST BLOOD SUGAR ONCE A  Strip 3    docusate sodium (COLACE) 100 MG Cap Take 300 mg by mouth every day.      Cyanocobalamin (VITAMIN B-12 PO) Take 1 Tab by mouth every day.      therapeutic multivitamin-minerals (THERAGRAN-M) Tab Take 1 Tab by mouth every day.      Calcium Carb-Cholecalciferol (CALCIUM 600+D3 PO) Take 1 Tab by mouth every morning.      aspirin EC (ECOTRIN) 81 MG TBEC Take 81 mg by mouth every bedtime.       No current facility-administered medications for this visit.       Allergies as of 02/14/2023 - Reviewed 02/14/2023   Allergen Reaction Noted    Asa [aspirin]  10/03/2011       Social History     Socioeconomic  "History    Marital status:      Spouse name: Not on file    Number of children: Not on file    Years of education: Not on file    Highest education level: Not on file   Occupational History    Occupation: management in past     Employer: OTHER   Tobacco Use    Smoking status: Never    Smokeless tobacco: Never   Vaping Use    Vaping Use: Never used   Substance and Sexual Activity    Alcohol use: No     Comment: Rarely    Drug use: No    Sexual activity: Yes     Partners: Male     Birth control/protection: None     Comment: , 2 children   Other Topics Concern    Not on file   Social History Narrative     30+ years.     [facility].    Brother doctor in [place].     Sister.     Son is internist in [place].      Social Determinants of Health     Financial Resource Strain: Not on file   Food Insecurity: Not on file   Transportation Needs: Not on file   Physical Activity: Not on file   Stress: Not on file   Social Connections: Not on file   Intimate Partner Violence: Not on file   Housing Stability: Not on file       Family History   Problem Relation Age of Onset    Cancer Mother         pancreatic    Stroke Mother     Cancer Father         prostate cancer    Lung Disease Father         emphysema    Arthritis Daughter         Ank. Spond    Cancer Maternal Aunt         breast cancer       Past Surgical History:   Procedure Laterality Date    CATARACT EXTRACTION WITH IOL Bilateral 2020    MASTECTOMY  01/01/2002    BREAST IMPLANT REMOVAL      BREAST RECONSTRUCTION      DILATION AND CURETTAGE      MASTECTOMY MODIFIED RADICAL      left         /72 (BP Location: Right arm, Patient Position: Sitting)   Pulse 79   Temp 36.7 °C (98 °F) (Temporal)   Resp 16   Ht 1.651 m (5' 5\")   Wt 63.5 kg (140 lb)   LMP 03/15/2005   SpO2 94%   BMI 23.30 kg/m²     Physical Exam  General: Alert and oriented, No apparent distress.  Eyes: Pupils are equal and reactive. No scleral icterus.  Throat: Clear no " erythema or exudates noted.  Mallampati is 4.  Neck: Supple. No cervical or supraclavicular lymphadenopathy noted. Thyroid not enlarged.  Lungs: Clear to auscultation bilaterally without any wheezing, crepitations.  Cardiovascular: Regular rate and rhythm. No murmurs, rubs or gallops.  Abdomen: Bowel sound +, soft, non tender, no rebound or guarding, no palpable organomegaly  Extremities: No edema.      Assessment and Plan    1. Essential hypertension  Stable.  Continue enalapril  - enalapril (VASOTEC) 10 MG Tab; Take 1 Tablet by mouth every day.  Dispense: 90 Tablet; Refill: 3  - Comp Metabolic Panel; Future  - TSH WITH REFLEX TO FT4; Future    2. Pure hypercholesterolemia  Tolerating atorvastatin, last lab record not available. Target LDL .  - atorvastatin (LIPITOR) 80 MG tablet; Take 1 Tablet by mouth every evening.  Dispense: 90 Tablet; Refill: 3  - Lipid Profile; Future    3. Prediabetes  used to be in diabetic range.  Currently controlled with lifestyle modification in prediabetic range.  - ONETOUCH ULTRA strip; USE TO TEST BLOOD SUGAR ONCE A DAY  Dispense: 100 Strip; Refill: 3  - HEMOGLOBIN A1C; Future    4. History of breast cancer  -In remission, continue annual mammo.    5. Snoring  Reported snoring by .  Crowded oropharynx.  - Referral to Pulmonary and Sleep Medicine    6. Need for hepatitis C screening test  - HEP C VIRUS ANTIBODY; Future      Followup: Return in about 5 months (around 7/14/2023) for Annual wellness visit.           Signed by: Agueda Resendez M.D.

## 2023-03-03 ENCOUNTER — TELEPHONE (OUTPATIENT)
Dept: HEALTH INFORMATION MANAGEMENT | Facility: OTHER | Age: 68
End: 2023-03-03
Payer: MEDICARE

## 2023-08-01 ENCOUNTER — HOSPITAL ENCOUNTER (OUTPATIENT)
Dept: LAB | Facility: MEDICAL CENTER | Age: 68
End: 2023-08-01
Attending: INTERNAL MEDICINE
Payer: MEDICARE

## 2023-08-01 DIAGNOSIS — R73.03 PREDIABETES: ICD-10-CM

## 2023-08-01 DIAGNOSIS — I10 ESSENTIAL HYPERTENSION: ICD-10-CM

## 2023-08-01 DIAGNOSIS — Z11.59 NEED FOR HEPATITIS C SCREENING TEST: ICD-10-CM

## 2023-08-01 DIAGNOSIS — Z85.3 HISTORY OF BREAST CANCER: ICD-10-CM

## 2023-08-01 DIAGNOSIS — E78.00 PURE HYPERCHOLESTEROLEMIA: ICD-10-CM

## 2023-08-01 LAB
ALBUMIN SERPL BCP-MCNC: 4.2 G/DL (ref 3.2–4.9)
ALBUMIN/GLOB SERPL: 1.7 G/DL
ALP SERPL-CCNC: 88 U/L (ref 30–99)
ALT SERPL-CCNC: 19 U/L (ref 2–50)
ANION GAP SERPL CALC-SCNC: 11 MMOL/L (ref 7–16)
AST SERPL-CCNC: 21 U/L (ref 12–45)
BASOPHILS # BLD AUTO: 0.7 % (ref 0–1.8)
BASOPHILS # BLD: 0.04 K/UL (ref 0–0.12)
BILIRUB SERPL-MCNC: 0.6 MG/DL (ref 0.1–1.5)
BUN SERPL-MCNC: 14 MG/DL (ref 8–22)
CALCIUM ALBUM COR SERPL-MCNC: 9 MG/DL (ref 8.5–10.5)
CALCIUM SERPL-MCNC: 9.2 MG/DL (ref 8.4–10.2)
CHLORIDE SERPL-SCNC: 107 MMOL/L (ref 96–112)
CHOLEST SERPL-MCNC: 209 MG/DL (ref 100–199)
CO2 SERPL-SCNC: 25 MMOL/L (ref 20–33)
CREAT SERPL-MCNC: 0.64 MG/DL (ref 0.5–1.4)
EOSINOPHIL # BLD AUTO: 0.14 K/UL (ref 0–0.51)
EOSINOPHIL NFR BLD: 2.6 % (ref 0–6.9)
ERYTHROCYTE [DISTWIDTH] IN BLOOD BY AUTOMATED COUNT: 41.6 FL (ref 35.9–50)
EST. AVERAGE GLUCOSE BLD GHB EST-MCNC: 140 MG/DL
FASTING STATUS PATIENT QL REPORTED: NORMAL
GFR SERPLBLD CREATININE-BSD FMLA CKD-EPI: 96 ML/MIN/1.73 M 2
GLOBULIN SER CALC-MCNC: 2.5 G/DL (ref 1.9–3.5)
GLUCOSE SERPL-MCNC: 108 MG/DL (ref 65–99)
HBA1C MFR BLD: 6.5 % (ref 4–5.6)
HCT VFR BLD AUTO: 45.2 % (ref 37–47)
HCV AB SER QL: NORMAL
HDLC SERPL-MCNC: 66 MG/DL
HGB BLD-MCNC: 14.7 G/DL (ref 12–16)
IMM GRANULOCYTES # BLD AUTO: 0.02 K/UL (ref 0–0.11)
IMM GRANULOCYTES NFR BLD AUTO: 0.4 % (ref 0–0.9)
LDLC SERPL CALC-MCNC: 120 MG/DL
LYMPHOCYTES # BLD AUTO: 1.97 K/UL (ref 1–4.8)
LYMPHOCYTES NFR BLD: 36 % (ref 22–41)
MCH RBC QN AUTO: 29.9 PG (ref 27–33)
MCHC RBC AUTO-ENTMCNC: 32.5 G/DL (ref 32.2–35.5)
MCV RBC AUTO: 92.1 FL (ref 81.4–97.8)
MONOCYTES # BLD AUTO: 0.38 K/UL (ref 0–0.85)
MONOCYTES NFR BLD AUTO: 6.9 % (ref 0–13.4)
NEUTROPHILS # BLD AUTO: 2.92 K/UL (ref 1.82–7.42)
NEUTROPHILS NFR BLD: 53.4 % (ref 44–72)
NRBC # BLD AUTO: 0 K/UL
NRBC BLD-RTO: 0 /100 WBC (ref 0–0.2)
PLATELET # BLD AUTO: 263 K/UL (ref 164–446)
PMV BLD AUTO: 8.2 FL (ref 9–12.9)
POTASSIUM SERPL-SCNC: 4.2 MMOL/L (ref 3.6–5.5)
PROT SERPL-MCNC: 6.7 G/DL (ref 6–8.2)
RBC # BLD AUTO: 4.91 M/UL (ref 4.2–5.4)
SODIUM SERPL-SCNC: 143 MMOL/L (ref 135–145)
TRIGL SERPL-MCNC: 114 MG/DL (ref 0–149)
TSH SERPL DL<=0.005 MIU/L-ACNC: 1.46 UIU/ML (ref 0.38–5.33)
WBC # BLD AUTO: 5.5 K/UL (ref 4.8–10.8)

## 2023-08-01 PROCEDURE — 83036 HEMOGLOBIN GLYCOSYLATED A1C: CPT | Mod: GA

## 2023-08-01 PROCEDURE — 84443 ASSAY THYROID STIM HORMONE: CPT

## 2023-08-01 PROCEDURE — 85025 COMPLETE CBC W/AUTO DIFF WBC: CPT

## 2023-08-01 PROCEDURE — 80053 COMPREHEN METABOLIC PANEL: CPT

## 2023-08-01 PROCEDURE — 80061 LIPID PANEL: CPT

## 2023-08-01 PROCEDURE — 36415 COLL VENOUS BLD VENIPUNCTURE: CPT

## 2023-08-01 PROCEDURE — 86803 HEPATITIS C AB TEST: CPT

## 2023-08-15 ENCOUNTER — OFFICE VISIT (OUTPATIENT)
Dept: MEDICAL GROUP | Facility: MEDICAL CENTER | Age: 68
End: 2023-08-15
Payer: MEDICARE

## 2023-08-15 VITALS
TEMPERATURE: 97 F | DIASTOLIC BLOOD PRESSURE: 54 MMHG | SYSTOLIC BLOOD PRESSURE: 96 MMHG | HEIGHT: 60 IN | OXYGEN SATURATION: 98 % | HEART RATE: 68 BPM | BODY MASS INDEX: 27.48 KG/M2 | WEIGHT: 140 LBS

## 2023-08-15 DIAGNOSIS — R73.03 PREDIABETES: ICD-10-CM

## 2023-08-15 DIAGNOSIS — Z00.00 ENCOUNTER FOR MEDICARE ANNUAL WELLNESS EXAM: ICD-10-CM

## 2023-08-15 DIAGNOSIS — E78.00 PURE HYPERCHOLESTEROLEMIA: ICD-10-CM

## 2023-08-15 DIAGNOSIS — M85.88 OSTEOPENIA OF LUMBAR SPINE: ICD-10-CM

## 2023-08-15 DIAGNOSIS — I10 PRIMARY HYPERTENSION: ICD-10-CM

## 2023-08-15 DIAGNOSIS — B35.1 ONYCHOMYCOSIS OF TOENAIL: ICD-10-CM

## 2023-08-15 DIAGNOSIS — Z12.31 ENCOUNTER FOR SCREENING MAMMOGRAM FOR MALIGNANT NEOPLASM OF BREAST: ICD-10-CM

## 2023-08-15 DIAGNOSIS — Z85.3 HISTORY OF BREAST CANCER: ICD-10-CM

## 2023-08-15 PROCEDURE — 3074F SYST BP LT 130 MM HG: CPT | Performed by: INTERNAL MEDICINE

## 2023-08-15 PROCEDURE — 3078F DIAST BP <80 MM HG: CPT | Performed by: INTERNAL MEDICINE

## 2023-08-15 PROCEDURE — G0438 PPPS, INITIAL VISIT: HCPCS | Performed by: INTERNAL MEDICINE

## 2023-08-15 RX ORDER — TERBINAFINE HYDROCHLORIDE 250 MG/1
250 TABLET ORAL DAILY
Qty: 90 TABLET | Refills: 0 | Status: SHIPPED | OUTPATIENT
Start: 2023-08-15 | End: 2023-08-25 | Stop reason: SDUPTHER

## 2023-08-15 ASSESSMENT — FIBROSIS 4 INDEX: FIB4 SCORE: 1.25

## 2023-08-15 ASSESSMENT — PATIENT HEALTH QUESTIONNAIRE - PHQ9: CLINICAL INTERPRETATION OF PHQ2 SCORE: 0

## 2023-08-15 ASSESSMENT — ACTIVITIES OF DAILY LIVING (ADL): BATHING_REQUIRES_ASSISTANCE: 0

## 2023-08-15 ASSESSMENT — ENCOUNTER SYMPTOMS: GENERAL WELL-BEING: GOOD

## 2023-08-15 NOTE — PROGRESS NOTES
Chief Complaint   Patient presents with    Annual Exam       HPI:  Hilda Garcia is a 68 y.o. here for Medicare Annual Wellness Visit     Patient Active Problem List    Diagnosis Date Noted    Prediabetes 07/05/2020    History of breast cancer 10/03/2011    Primary hypertension 10/03/2011    HTN (hypertension) 10/03/2011    Hyperlipidemia 10/03/2011       Current Outpatient Medications   Medication Sig Dispense Refill    terbinafine (LAMISIL) 250 MG Tab Take 1 Tablet by mouth every day. 90 Tablet 0    enalapril (VASOTEC) 10 MG Tab Take 1 Tablet by mouth every day. 90 Tablet 3    atorvastatin (LIPITOR) 80 MG tablet Take 1 Tablet by mouth every evening. 90 Tablet 3    ONETOUCH ULTRA strip USE TO TEST BLOOD SUGAR ONCE A  Strip 3    docusate sodium (COLACE) 100 MG Cap Take 300 mg by mouth every day.      Cyanocobalamin (VITAMIN B-12 PO) Take 1 Tab by mouth every day.      therapeutic multivitamin-minerals (THERAGRAN-M) Tab Take 1 Tab by mouth every day.      Calcium Carb-Cholecalciferol (CALCIUM 600+D3 PO) Take 1 Tab by mouth every morning.      aspirin EC (ECOTRIN) 81 MG TBEC Take 81 mg by mouth every bedtime.       No current facility-administered medications for this visit.          Current supplements as per medication list.     Allergies: Asa [aspirin]    Current social contact/activities:  walking daily, watching grand kids, gardening    She  reports that she has never smoked. She has never used smokeless tobacco. She reports that she does not drink alcohol and does not use drugs.  Counseling given: Not Answered      ROS:    Gait: Uses no assistive device  Ostomy: No  Other tubes: No  Amputations: No  Chronic oxygen use: No  Last eye exam: June 2023  Wears hearing aids: No   : Denies any urinary leakage during the last 6 months    Screening:    Depression Screening  Little interest or pleasure in doing things?  0 - not at all  Feeling down, depressed , or hopeless? 0 - not at all  Patient Health  Questionnaire Score: 0     If depressive symptoms identified deferred to follow up visit unless specifically addressed in assessment and plan.    Interpretation of PHQ-9 Total Score   Score Severity   1-4 No Depression   5-9 Mild Depression   10-14 Moderate Depression   15-19 Moderately Severe Depression   20-27 Severe Depression    Screening for Cognitive Impairment  Three Minute Recall (daughter, heaven, mountain) 3/3    Anil clock face with all 12 numbers and set the hands to show 10 past 11.  Yes    Cognitive concerns identified deferred for follow up unless specifically addressed in assessment and plan.    Fall Risk Assessment  Has the patient had two or more falls in the last year or any fall with injury in the last year?  No    Safety Assessment  Throw rugs on floor.  Yes  Handrails on all stairs.  No  Good lighting in all hallways.  Yes  Difficulty hearing.  No  Patient counseled about all safety risks that were identified.    Functional Assessment ADLs  Are there any barriers preventing you from cooking for yourself or meeting nutritional needs?  No.    Are there any barriers preventing you from driving safely or obtaining transportation?  No.    Are there any barriers preventing you from using a telephone or calling for help?  No.    Are there any barriers preventing you from shopping?  No.    Are there any barriers preventing you from taking care of your own finances?  No.    Are there any barriers preventing you from managing your medications?  No.    Are there any barriers preventing you from showering, bathing or dressing yourself?  No.    Are you currently engaging in any exercise or physical activity?  Yes.     What is your perception of your health?  Good    Advance Care Planning  Do you have an Advance Directive, Living Will, Durable Power of , or POLST? Yes    Living Will     is on file      Health Maintenance Summary            Overdue - IMM ZOSTER VACCINES (1 of 2) Overdue since  9/15/2015      07/21/2015  Imm Admin: Varicella Vaccine Live              Ordered - BONE DENSITY (Every 5 Years) Ordered on 8/15/2023      07/22/2015  DS-BONE DENSITY STUDY (DEXA)              IMM INFLUENZA (1) Next due on 9/1/2023 08/20/2022  Imm Admin: Influenza Vaccine Adult HD    09/08/2021  Imm Admin: Influenza Vaccine Adult HD    08/28/2020  Imm Admin: Influenza Vaccine Adult HD              MAMMOGRAM (Yearly) Next due on 12/20/2023 12/20/2022  MA-SCREENING MAMMO BILAT W/TOMOSYNTHESIS W/CAD    12/14/2021  MA-SCREENING MAMMO BILAT W/TOMOSYNTHESIS W/CAD    12/01/2017  GO-[id number]    12/22/2016  IN-[id number]    12/21/2015  MA-SCREEN MAMMO W/CAD-BILAT    Only the first 5 history entries have been loaded, but more history exists.              Annual Wellness Visit (Every 366 Days) Next due on 8/15/2024      08/15/2023  Visit Dx: Encounter for Medicare annual wellness exam              IMM DTaP/Tdap/Td Vaccine (2 - Td or Tdap) Next due on 1/6/2025 01/06/2015  Imm Admin: Tdap Vaccine              COLORECTAL CANCER SCREENING (COLONOSCOPY - Every 10 Years) Next due on 6/21/2027 06/21/2017  REFERRAL TO GI FOR COLONOSCOPY    06/21/2017  REFERRAL TO GI FOR COLONOSCOPY              IMM PNEUMOCOCCAL VACCINE: 65+ Years (Series Information) Completed      05/28/2022  Imm Admin: Pneumococcal Conjugate Vaccine (PCV20)    09/19/2020  Imm Admin: Pneumococcal Conjugate Vaccine (Prevnar/PCV-13)    11/05/2009  Imm Admin: Pneumococcal polysaccharide vaccine (PPSV-23)              COVID-19 Vaccine (Series Information) Completed      05/09/2023  Imm Admin: PFIZER BIVALENT SARS-COV-2 VACCINE (12+)    09/14/2022  Imm Admin: PFIZER BIVALENT BOOSTER SARS-COV-2 VACCINE (12+)    05/11/2022  Imm Admin: PFIZER DELCID CAP SARS-COV-2 VACCINATION (12+)    10/27/2021  Imm Admin: PFIZER PURPLE CAP SARS-COV-2 VACCINATION (12+)    04/15/2021  Imm Admin: PFIZER PURPLE CAP SARS-COV-2 VACCINATION (12+)    Only the  first 5 history entries have been loaded, but more history exists.              HEPATITIS C SCREENING  Completed      08/01/2023  Hepatitis C Antibody component of HEP C VIRUS ANTIBODY              IMM HEP B VACCINE (Series Information) Aged Out      No completion history exists for this topic.              HPV Vaccines (Series Information) Aged Out      No completion history exists for this topic.              IMM MENINGOCOCCAL ACWY VACCINE (Series Information) Aged Out      No completion history exists for this topic.              Discontinued - RETINAL SCREENING  Discontinued        Frequency changed to Never automatically (Topic No Longer Applies)    05/10/2016  REFERRAL FOR RETINAL SCREENING EXAM    08/05/2015  AMB REFERRAL FOR RETINAL SCREENING EXAM    05/06/2014  AMB REFERRAL FOR RETINAL SCREENING EXAM              Discontinued - A1C SCREENING  Ordered on 8/15/2023        Frequency changed to Never automatically (Topic No Longer Applies)    08/01/2023  HEMOGLOBIN A1C    01/11/2022  HEMOGLOBIN A1C    07/05/2020  HEMOGLOBIN A1C    12/23/2015  HM Hemoglobin a1c    Only the first 5 history entries have been loaded, but more history exists.              Discontinued - FASTING LIPID PROFILE  Ordered on 8/15/2023        Frequency changed to Never automatically (Topic No Longer Applies)    08/01/2023  Lipid Profile    01/11/2022  Lipid Profile    07/06/2020  Lipid Profile    12/23/2015  LIPID PROFILE    Only the first 5 history entries have been loaded, but more history exists.              Discontinued - URINE ACR / MICROALBUMIN  Ordered on 8/15/2023        Frequency changed to Never automatically (Topic No Longer Applies)    01/11/2022  MICROALBUMIN CREAT RATIO URINE    04/29/2015  Done    04/24/2013  MICROALBUMIN CREAT RATIO URINE              Discontinued - SERUM CREATININE  Ordered on 8/15/2023        Frequency changed to Never automatically (Topic No Longer Applies)    08/01/2023  Comp Metabolic Panel     01/11/2022  Comp Metabolic Panel    07/05/2020  COMP METABOLIC PANEL    12/23/2015  COMP METABOLIC PANEL    Only the first 5 history entries have been loaded, but more history exists.              Discontinued - CERVICAL CANCER SCREENING  Discontinued        Frequency changed to Never automatically (Topic No Longer Applies)    06/03/2015  PAP IG (IMAGE GUIDED)              Discontinued - DIABETES MONOFILAMENT / LE EXAM  Discontinued        Frequency changed to Never automatically (Topic No Longer Applies)    12/16/2014  Done    07/30/2013   Diabetes Foot Exam Done                    Patient Care Team:  Agueda Resendez M.D. as PCP - General (Internal Medicine)  Holger Gaitan M.D. (Inactive) (Ophthalmology)        Social History     Tobacco Use    Smoking status: Never    Smokeless tobacco: Never   Vaping Use    Vaping Use: Never used   Substance Use Topics    Alcohol use: No     Comment: Rarely    Drug use: No     Family History   Problem Relation Age of Onset    Cancer Mother         pancreatic    Stroke Mother     Cancer Father         prostate cancer    Lung Disease Father         emphysema    Arthritis Daughter         Ank. Spond    Cancer Maternal Aunt         breast cancer     She  has a past medical history of Asthma, Breast cancer (HCC), Glenoid labrum tear (11/7/2011), HTN (hypertension) (10/3/2011), breast cancer (10/3/2011), Hyperlipidemia (10/3/2011), Tendonitis of shoulder (11/7/2011), and Type II or unspecified type diabetes mellitus without mention of complication, not stated as uncontrolled (10/3/2011).   Past Surgical History:   Procedure Laterality Date    CATARACT EXTRACTION WITH IOL Bilateral 2020    MASTECTOMY  01/01/2002    BREAST IMPLANT REMOVAL      BREAST RECONSTRUCTION      DILATION AND CURETTAGE      MASTECTOMY MODIFIED RADICAL      left       Exam:   BP 96/54   Pulse 68   Temp 36.1 °C (97 °F) (Temporal)   Ht 1.524 m (5')   Wt 63.5 kg (140 lb)   SpO2 98%  Body mass index is 27.34  kg/m².    Hearing good.    Dentition good  Alert, oriented in no acute distress.  Eye contact is good, speech goal directed, affect calm    Assessment and Plan. The following treatment and monitoring plan is recommended:    1. Encounter for Medicare annual wellness exam    2. Prediabetes  Slightly worse. She will watch her diet more carefully. She monitors FBS at home as well, 97 this AM.  Closer recheck in 3 months.  If improving, can go back to 6-month follow-up.  - HEMOGLOBIN A1C; Future  - MICROALBUMIN CREAT RATIO URINE; Future    3. Pure hypercholesterolemia  Better compared to last time but not at target yet.  On max dose of atorvastatin.  Recheck in 3 months.  If it is not at target, will consider Zetia.  - Comp Metabolic Panel; Future  - Lipid Profile; Future    4. Primary hypertension  Controlled with enalapril.    5. History of breast cancer  Screening mammogram in December.    6. Onychomycosis of toenail  Left big toenail.  Thickening of toenail.  Slow improvement with intermittent use of topical over several months prescribed by podiatrist.  It is not gone. She would like to try oral.   - terbinafine (LAMISIL) 250 MG Tab; Take 1 Tablet by mouth every day.  Dispense: 90 Tablet; Refill: 0    7. Osteopenia of lumbar spine  She recalls getting it done last year in [facility]. However, no record on file. She will check with insurance and get it done if none was done in the past 2 yrs.  - DS-BONE DENSITY STUDY (DEXA); Future      She also recalls getting Shingrix at [facility] with her has been the same time which was in 2019 for her .  Recommended to get the record at [facility].    Services suggested: No services needed at this time  Health Care Screening: Age-appropriate preventive services recommended by USPTF and ACIP covered by Medicare were discussed today. Services ordered if indicated and agreed upon by the patient.  Referrals offered: Community-based lifestyle interventions to reduce health risks and promote  self-management and wellness, fall prevention, nutrition, physical activity, tobacco-use cessation, weight loss, and mental health services as per orders if indicated.    Discussion today about general wellness and lifestyle habits:    Prevent falls and reduce trip hazards; Cautioned about securing or removing rugs.  Have a working fire alarm and carbon monoxide detector;   Engage in regular physical activity and social activities     Follow-up: Return in about 6 months (around 2/15/2024).

## 2023-09-22 ENCOUNTER — NON-PROVIDER VISIT (OUTPATIENT)
Dept: MEDICAL GROUP | Facility: MEDICAL CENTER | Age: 68
End: 2023-09-22
Payer: MEDICARE

## 2023-09-22 DIAGNOSIS — Z23 NEED FOR VACCINATION: ICD-10-CM

## 2023-09-22 PROCEDURE — G0008 ADMIN INFLUENZA VIRUS VAC: HCPCS | Performed by: INTERNAL MEDICINE

## 2023-09-22 PROCEDURE — 90662 IIV NO PRSV INCREASED AG IM: CPT | Performed by: INTERNAL MEDICINE

## 2023-09-23 NOTE — NON-PROVIDER
"Hilda Garcia is a 68 y.o. female here for a non-provider visit for:   FLU    Reason for immunization: Annual Flu Vaccine  Immunization records indicate need for vaccine: Yes, confirmed with Epic  Minimum interval has been met for this vaccine: Yes  ABN completed: No    VIS Dated  8/6/21 was given to patient: Yes  All IAC Questionnaire questions were answered \"No.\"    Patient tolerated injection and no adverse effects were observed or reported: Yes    Pt scheduled for next dose in series: Not Indicated    "

## 2023-12-19 ENCOUNTER — HOSPITAL ENCOUNTER (OUTPATIENT)
Dept: LAB | Facility: MEDICAL CENTER | Age: 68
End: 2023-12-19
Attending: INTERNAL MEDICINE
Payer: MEDICARE

## 2023-12-19 DIAGNOSIS — I10 PRIMARY HYPERTENSION: ICD-10-CM

## 2023-12-19 DIAGNOSIS — E78.00 PURE HYPERCHOLESTEROLEMIA: ICD-10-CM

## 2023-12-19 DIAGNOSIS — R73.03 PREDIABETES: ICD-10-CM

## 2023-12-19 LAB
ALBUMIN SERPL BCP-MCNC: 4.4 G/DL (ref 3.2–4.9)
ALBUMIN/GLOB SERPL: 1.8 G/DL
ALP SERPL-CCNC: 92 U/L (ref 30–99)
ALT SERPL-CCNC: 20 U/L (ref 2–50)
ANION GAP SERPL CALC-SCNC: 11 MMOL/L (ref 7–16)
AST SERPL-CCNC: 20 U/L (ref 12–45)
BILIRUB SERPL-MCNC: 0.6 MG/DL (ref 0.1–1.5)
BUN SERPL-MCNC: 13 MG/DL (ref 8–22)
CALCIUM ALBUM COR SERPL-MCNC: 8.8 MG/DL (ref 8.5–10.5)
CALCIUM SERPL-MCNC: 9.1 MG/DL (ref 8.4–10.2)
CHLORIDE SERPL-SCNC: 103 MMOL/L (ref 96–112)
CHOLEST SERPL-MCNC: 220 MG/DL (ref 100–199)
CO2 SERPL-SCNC: 27 MMOL/L (ref 20–33)
CREAT SERPL-MCNC: 0.67 MG/DL (ref 0.5–1.4)
CREAT UR-MCNC: 109.38 MG/DL
EST. AVERAGE GLUCOSE BLD GHB EST-MCNC: 131 MG/DL
FASTING STATUS PATIENT QL REPORTED: NORMAL
GFR SERPLBLD CREATININE-BSD FMLA CKD-EPI: 95 ML/MIN/1.73 M 2
GLOBULIN SER CALC-MCNC: 2.5 G/DL (ref 1.9–3.5)
GLUCOSE SERPL-MCNC: 109 MG/DL (ref 65–99)
HBA1C MFR BLD: 6.2 % (ref 4–5.6)
HDLC SERPL-MCNC: 71 MG/DL
LDLC SERPL CALC-MCNC: 122 MG/DL
MICROALBUMIN UR-MCNC: <1.2 MG/DL
MICROALBUMIN/CREAT UR: NORMAL MG/G (ref 0–30)
POTASSIUM SERPL-SCNC: 4 MMOL/L (ref 3.6–5.5)
PROT SERPL-MCNC: 6.9 G/DL (ref 6–8.2)
SODIUM SERPL-SCNC: 141 MMOL/L (ref 135–145)
TRIGL SERPL-MCNC: 133 MG/DL (ref 0–149)

## 2023-12-19 PROCEDURE — 36415 COLL VENOUS BLD VENIPUNCTURE: CPT

## 2023-12-19 PROCEDURE — 80053 COMPREHEN METABOLIC PANEL: CPT

## 2023-12-19 PROCEDURE — 82043 UR ALBUMIN QUANTITATIVE: CPT

## 2023-12-19 PROCEDURE — 80061 LIPID PANEL: CPT

## 2023-12-19 PROCEDURE — 82570 ASSAY OF URINE CREATININE: CPT

## 2023-12-19 PROCEDURE — 83036 HEMOGLOBIN GLYCOSYLATED A1C: CPT | Mod: GA

## 2023-12-21 ENCOUNTER — HOSPITAL ENCOUNTER (OUTPATIENT)
Dept: RADIOLOGY | Facility: MEDICAL CENTER | Age: 68
End: 2023-12-21
Attending: INTERNAL MEDICINE
Payer: MEDICARE

## 2023-12-21 DIAGNOSIS — M85.88 OSTEOPENIA OF LUMBAR SPINE: ICD-10-CM

## 2023-12-21 DIAGNOSIS — Z12.31 ENCOUNTER FOR SCREENING MAMMOGRAM FOR MALIGNANT NEOPLASM OF BREAST: ICD-10-CM

## 2023-12-21 PROCEDURE — 77080 DXA BONE DENSITY AXIAL: CPT

## 2023-12-21 PROCEDURE — 77063 BREAST TOMOSYNTHESIS BI: CPT | Mod: 52

## 2023-12-30 DIAGNOSIS — E78.00 PURE HYPERCHOLESTEROLEMIA: ICD-10-CM

## 2023-12-30 DIAGNOSIS — I10 ESSENTIAL HYPERTENSION: ICD-10-CM

## 2024-01-05 RX ORDER — ATORVASTATIN CALCIUM 80 MG/1
80 TABLET, FILM COATED ORAL EVERY EVENING
Qty: 90 TABLET | Refills: 3 | Status: SHIPPED | OUTPATIENT
Start: 2024-01-05

## 2024-01-05 RX ORDER — ENALAPRIL MALEATE 10 MG/1
10 TABLET ORAL DAILY
Qty: 90 TABLET | Refills: 3 | Status: SHIPPED | OUTPATIENT
Start: 2024-01-05

## 2024-01-09 DIAGNOSIS — R73.03 PREDIABETES: ICD-10-CM

## 2024-01-09 PROCEDURE — RXMED WILLOW AMBULATORY MEDICATION CHARGE: Performed by: INTERNAL MEDICINE

## 2024-01-10 ENCOUNTER — PHARMACY VISIT (OUTPATIENT)
Dept: PHARMACY | Facility: MEDICAL CENTER | Age: 69
End: 2024-01-10
Payer: COMMERCIAL

## 2024-01-30 ENCOUNTER — OFFICE VISIT (OUTPATIENT)
Dept: MEDICAL GROUP | Facility: MEDICAL CENTER | Age: 69
End: 2024-01-30
Payer: MEDICARE

## 2024-01-30 ENCOUNTER — PHARMACY VISIT (OUTPATIENT)
Dept: PHARMACY | Facility: MEDICAL CENTER | Age: 69
End: 2024-01-30
Payer: COMMERCIAL

## 2024-01-30 VITALS
BODY MASS INDEX: 29.06 KG/M2 | DIASTOLIC BLOOD PRESSURE: 62 MMHG | HEART RATE: 74 BPM | OXYGEN SATURATION: 97 % | WEIGHT: 148 LBS | SYSTOLIC BLOOD PRESSURE: 116 MMHG | TEMPERATURE: 97 F | HEIGHT: 60 IN

## 2024-01-30 DIAGNOSIS — R73.03 PREDIABETES: ICD-10-CM

## 2024-01-30 DIAGNOSIS — B35.1 ONYCHOMYCOSIS OF TOENAIL: ICD-10-CM

## 2024-01-30 DIAGNOSIS — I10 PRIMARY HYPERTENSION: ICD-10-CM

## 2024-01-30 DIAGNOSIS — M85.89 OSTEOPENIA OF MULTIPLE SITES: ICD-10-CM

## 2024-01-30 DIAGNOSIS — E78.00 PURE HYPERCHOLESTEROLEMIA: ICD-10-CM

## 2024-01-30 PROCEDURE — RXMED WILLOW AMBULATORY MEDICATION CHARGE: Performed by: INTERNAL MEDICINE

## 2024-01-30 PROCEDURE — 3074F SYST BP LT 130 MM HG: CPT | Performed by: INTERNAL MEDICINE

## 2024-01-30 PROCEDURE — 99214 OFFICE O/P EST MOD 30 MIN: CPT | Performed by: INTERNAL MEDICINE

## 2024-01-30 PROCEDURE — 3078F DIAST BP <80 MM HG: CPT | Performed by: INTERNAL MEDICINE

## 2024-01-30 RX ORDER — TERBINAFINE HYDROCHLORIDE 250 MG/1
250 TABLET ORAL DAILY
Qty: 90 TABLET | Refills: 0 | Status: SHIPPED | OUTPATIENT
Start: 2024-01-30

## 2024-01-30 RX ORDER — RESPIRATORY SYNCYTIAL VISUS VACCINE RECOMBINANT, ADJUVANTED 120MCG/0.5
0.5 KIT INTRAMUSCULAR
Qty: 0.5 ML | Refills: 0 | OUTPATIENT
Start: 2024-01-30

## 2024-01-30 ASSESSMENT — FIBROSIS 4 INDEX: FIB4 SCORE: 1.16

## 2024-01-31 NOTE — PROGRESS NOTES
Established Patient    Hilda presents today with the following:    CC: Follow-up for chronic medical problems    HPI:   Hilda is a 68 y.o. female who came in for above.    Her fungal toenail in left big toe is better but not completely gone.    Otherwise, she is doing okay.  She is curious about her lab results and dexa result.    ROS:   As above    Patient Active Problem List    Diagnosis Date Noted    Prediabetes 07/05/2020    History of breast cancer 10/03/2011    Primary hypertension 10/03/2011    Hyperlipidemia 10/03/2011       Current Outpatient Medications   Medication Sig Dispense Refill    terbinafine (LAMISIL) 250 MG Tab Take 1 Tablet by mouth every day. 90 Tablet 0    glucose blood strip Use 1 Strip by Other route in the morning, at noon, and at bedtime. 100 Strip 3    injection [id number] Vac Recomb Adjuvanted (AREXVY) 120 MCG/0.5ML Recon Susp Inject 0.5 mL into the shoulder, thigh, or buttocks. 0.5 mL 0    enalapril (VASOTEC) 10 MG Tab TAKE 1 TABLET BY MOUTH EVERY DAY 90 Tablet 3    atorvastatin (LIPITOR) 80 MG tablet TAKE 1 TABLET BY MOUTH EVERY DAY IN THE EVENING 90 Tablet 3    ONETOUCH ULTRA strip USE TO TEST BLOOD SUGAR ONCE A  Strip 3    docusate sodium (COLACE) 100 MG Cap Take 300 mg by mouth every day.      Cyanocobalamin (VITAMIN B-12 PO) Take 1 Tab by mouth every day.      therapeutic multivitamin-minerals (THERAGRAN-M) Tab Take 1 Tab by mouth every day.      Calcium Carb-Cholecalciferol (CALCIUM 600+D3 PO) Take 1 Tab by mouth every morning.      aspirin EC (ECOTRIN) 81 MG TBEC Take 81 mg by mouth every bedtime.       No current facility-administered medications for this visit.         /62   Pulse 74   Temp 36.1 °C (97 °F) (Temporal)   Ht 1.524 m (5')   Wt 67.1 kg (148 lb)   LMP 03/15/2005   SpO2 97%   BMI 28.90 kg/m²     Physical Exam  General: Alert and oriented, No apparent distress.  Neck: Supple. No cervical or supraclavicular lymphadenopathy noted. Thyroid not  enlarged.  Lungs: Clear to auscultation bilaterally without any wheezing, crepitations.  Cardiovascular: Regular rate and rhythm. No murmurs, rubs or gallops.  Abdomen: Bowel sound +, soft, non tender, no rebound or guarding, no palpable organomegaly  Extremities: No edema.      Assessment and Plan    1. Onychomycosis of toenail  She may benefit from extending terbinafine up to 6 months.  She would like to dispense medication and consider polyp.  - terbinafine (LAMISIL) 250 MG Tab; Take 1 Tablet by mouth every day.  Dispense: 90 Tablet; Refill: 0    2. Prediabetes  Stable.  Recheck in 6 months.  - glucose blood strip; Use 1 Strip by Other route in the morning, at noon, and at bedtime.  Dispense: 100 Strip; Refill: 3  - HEMOGLOBIN A1C; Future    3. Pure hypercholesterolemia  LDL not quite at the goal.  She would like to continue lifestyle management at this time.  It was not the best with holidays. We will see if she is improving next time.  If not, consider switching to rosuvastatin or adding zetia.  - Comp Metabolic Panel; Future  - Lipid Profile; Future    4. Primary hypertension  Stable.  - TSH WITH REFLEX TO FT4; Future    5. Osteopenia of multiple sites  Slight decline.  Recommended vitamin D 2000 IU daily and calcium 600 mg daily.  She drinks milks so she can calculate dietary intake to make sure total calcium intake is 1200 mg daily.  Recommended to consider wearing weighted vest while she is walking.  - VITAMIN D,25 HYDROXY (DEFICIENCY); Future        Return in about 6 months (around 7/30/2024) for Annual wellness visit.         Signed by: Agueda Resendez M.D.

## 2024-02-09 PROCEDURE — RXMED WILLOW AMBULATORY MEDICATION CHARGE: Performed by: INTERNAL MEDICINE

## 2024-02-12 ENCOUNTER — PHARMACY VISIT (OUTPATIENT)
Dept: PHARMACY | Facility: MEDICAL CENTER | Age: 69
End: 2024-02-12
Payer: COMMERCIAL

## 2024-02-12 ENCOUNTER — TELEPHONE (OUTPATIENT)
Dept: HEALTH INFORMATION MANAGEMENT | Facility: OTHER | Age: 69
End: 2024-02-12
Payer: MEDICARE

## 2024-03-06 ENCOUNTER — OFFICE VISIT (OUTPATIENT)
Dept: MEDICAL GROUP | Facility: MEDICAL CENTER | Age: 69
End: 2024-03-06
Payer: MEDICARE

## 2024-03-06 VITALS
HEART RATE: 88 BPM | WEIGHT: 151 LBS | HEIGHT: 60 IN | BODY MASS INDEX: 29.64 KG/M2 | OXYGEN SATURATION: 95 % | DIASTOLIC BLOOD PRESSURE: 60 MMHG | TEMPERATURE: 98.2 F | SYSTOLIC BLOOD PRESSURE: 132 MMHG

## 2024-03-06 DIAGNOSIS — M65.331 TRIGGER MIDDLE FINGER OF RIGHT HAND: ICD-10-CM

## 2024-03-06 DIAGNOSIS — M26.69 CREPITUS OF LEFT TMJ ON OPENING OF JAW: ICD-10-CM

## 2024-03-06 DIAGNOSIS — N94.818 DISCOMFORT OF VULVA: ICD-10-CM

## 2024-03-06 DIAGNOSIS — R35.0 URINARY FREQUENCY: ICD-10-CM

## 2024-03-06 LAB
APPEARANCE UR: CLEAR
BILIRUB UR STRIP-MCNC: NEGATIVE MG/DL
COLOR UR AUTO: ABNORMAL
GLUCOSE UR STRIP.AUTO-MCNC: NEGATIVE MG/DL
KETONES UR STRIP.AUTO-MCNC: NEGATIVE MG/DL
LEUKOCYTE ESTERASE UR QL STRIP.AUTO: ABNORMAL
NITRITE UR QL STRIP.AUTO: NEGATIVE
PH UR STRIP.AUTO: 5.5 [PH] (ref 5–8)
PROT UR QL STRIP: NEGATIVE MG/DL
RBC UR QL AUTO: ABNORMAL
SP GR UR STRIP.AUTO: 1.01
UROBILINOGEN UR STRIP-MCNC: ABNORMAL MG/DL

## 2024-03-06 PROCEDURE — 99213 OFFICE O/P EST LOW 20 MIN: CPT | Performed by: INTERNAL MEDICINE

## 2024-03-06 PROCEDURE — 3075F SYST BP GE 130 - 139MM HG: CPT | Performed by: INTERNAL MEDICINE

## 2024-03-06 PROCEDURE — 3078F DIAST BP <80 MM HG: CPT | Performed by: INTERNAL MEDICINE

## 2024-03-06 PROCEDURE — 81002 URINALYSIS NONAUTO W/O SCOPE: CPT | Performed by: INTERNAL MEDICINE

## 2024-03-06 ASSESSMENT — FIBROSIS 4 INDEX: FIB4 SCORE: 1.16

## 2024-03-07 NOTE — PROGRESS NOTES
Established Patient    Hilda presents today with the following:    Chief Complaint   Patient presents with    Other     Genital concerns- has something down there but not sure what it is   Lock jaw- x2 weeks, on and off, no pain or discomfort    Trigger Finger     X1 week, on and off         HPI:   Hilda is a 68 y.o. female who came in for above.    Since last Friday, she has some pain around vulva or outer urethral meatus, feels outside. She was also having frequency. She is drinking water and she is feeling better.     No flank pain, fever, chills.     She is having locking of left middle finger. She did use quite a lot with casino playing.        ROS:   As above    Patient Active Problem List    Diagnosis Date Noted    Prediabetes 07/05/2020    History of breast cancer 10/03/2011    Primary hypertension 10/03/2011    Hyperlipidemia 10/03/2011       Current Outpatient Medications   Medication Sig Dispense Refill    diclofenac sodium (VOLTAREN) 1 % Gel Apply 2 g topically 4 times a day as needed (trigger joint). 100 g 0    injection RSVPreF3 Vac Recomb Adjuvanted (AREXVY) 120 MCG/0.5ML Recon Susp Inject 0.5 mL into the shoulder, thigh, or buttocks. 0.5 mL 0    terbinafine (LAMISIL) 250 MG Tab Take 1 Tablet by mouth every day. 90 Tablet 0    glucose blood strip Use 1 Strip by Other route in the morning, at noon, and at bedtime. 100 Strip 3    enalapril (VASOTEC) 10 MG Tab TAKE 1 TABLET BY MOUTH EVERY DAY 90 Tablet 3    atorvastatin (LIPITOR) 80 MG tablet TAKE 1 TABLET BY MOUTH EVERY DAY IN THE EVENING 90 Tablet 3    ONETOUCH ULTRA strip USE TO TEST BLOOD SUGAR ONCE A  Strip 3    docusate sodium (COLACE) 100 MG Cap Take 300 mg by mouth every day.      Cyanocobalamin (VITAMIN B-12 PO) Take 1 Tab by mouth every day.      therapeutic multivitamin-minerals (THERAGRAN-M) Tab Take 1 Tab by mouth every day.      Calcium Carb-Cholecalciferol (CALCIUM 600+D3 PO) Take 1 Tab by mouth every morning.      aspirin   **STROKE CODE CONSULT NOTE**    Last known well time: 10:00am on 3/29    HPI:  76 yo F w/ a PMH of HTN, HLD, DM, TIA in  (no residual deficits), PVD, Breast CA s/p chemo/radiation (on Anastrazole) s/p cardiac cath and had 3 stents placed on 3/29.  Stroke code was called about 15 minutes post cardiac cath due to confusion, difficulty following commands and getting words out. Patient received 1mg of Versed and 25mg of fentanyl during the procedure and had 8,000U of heparin total, most recent bolus was 30 minutes ago. Patient was also noted to be hypertension SBP >200. Stroke code was called. Patient able to say name, however, difficult to name some objects such as color blue, fingers, phone. No other focal deficits at the time. Initial NIHSS 3.  Patient was given narcan and fluoxetine due to possible confusion from narcotics. Patient appeared to name more objects and participate in exam more at this time.     Patient was then later examined after given hydralazine and labetalol to control BP and now had some mild left sided neglect, left arm and leg 4/5. BP at the time was 150/80. Patient able to follow some commands and name some objects.     Unable to obtain ROS from patient.       T(C): --  HR: --  BP: --  RR: --  SpO2: --    PAST MEDICAL & SURGICAL HISTORY:  Breast cancer    DM (diabetes mellitus)    HLD (hyperlipidemia)    HTN (hypertension)    H/O lumpectomy    S/P dilation and curettage    S/P appendectomy    H/O:         FAMILY HISTORY:  Family history of early CAD  stent in sister      SOCIAL HISTORY:  unknown     ROS:  as per HPI    MEDICATIONS  (STANDING):  anastrozole 1 milliGRAM(s) Oral daily  aspirin enteric coated 325 milliGRAM(s) Oral Once  atorvastatin 80 milliGRAM(s) Oral at bedtime  clopidogrel Tablet 600 milliGRAM(s) Oral Once  dextrose 40% Gel 15 Gram(s) Oral Once  dextrose 5%. 1000 milliLiter(s) (50 mL/Hr) IV Continuous <Continuous>  dextrose 5%. 1000 milliLiter(s) (100 mL/Hr) IV Continuous <Continuous>  dextrose 50% Injectable 25 Gram(s) IV Push Once  dextrose 50% Injectable 12.5 Gram(s) IV Push Once  dextrose 50% Injectable 25 Gram(s) IV Push Once  glucagon  Injectable 1 milliGRAM(s) IntraMuscular Once  insulin lispro (ADMELOG) corrective regimen sliding scale   SubCutaneous Before meals and at bedtime  insulin lispro Injectable (ADMELOG) 3 Unit(s) SubCutaneous three times a day with meals  naloxone Injectable 0.4 milliGRAM(s) IV Push once  ondansetron Injectable 4 milliGRAM(s) IV Push once  sodium chloride 0.9% Bolus 250 milliLiter(s) IV Bolus once  sodium chloride 0.9%. 500 milliLiter(s) (75 mL/Hr) IV Continuous <Continuous>    MEDICATIONS  (PRN):    Allergies    NSAIDs (Swelling)    Intolerances    codeine (Vomiting)    Vital Signs Last 24 Hrs  T(C): --  T(F): --  HR: --  BP: --  BP(mean): --  RR: --  SpO2: --    Physical exam:  General: No acute distress, lethargic, opens eyes to voice    Neurologic:  -Mental status: lethargic, oriented to person. Does not state time, location or year. able to follow some commands. able to name some objection such as fingers, could not name blue, phone, ID badge.   -Cranial nerves:   II: Visual fields are full to confrontation.  III, IV, VI: Extraocular movements are intact without nystagmus. Pupils equally round and reactive to light  VII: Face is symmetric with normal eye closure and smile  Motor: Normal bulk and tone. No pronator drift. Strength bilateral upper extremity 5/5, bilateral lower extremities 5/5.  Sensation: Intact to light touch bilaterally. No neglect or extinction on double simultaneous testing.  Coordination: No dysmetria on finger-to-nose bilaterally  Reflexes: Downgoing toes bilaterally     Initial NIHSS: 3 for mental status, commands, naming  ASPECTS Score: 8    NEURO EXAM after BP was lowered to 150/80   - about 1 hour after stroke code was called  -Mental status: lethargic, oriented to person and hospital. able to follow some commands  -Cranial nerves:   III, IV, VI: Extraocular movements are intact without nystagmus. Pupils equally round and reactive to light  VII: Face is symmetric with normal eye closure and smile  Motor: Normal bulk and tone. No pronator drift. left upper extremity 4/5, left lower extremity 4/5. right upper and lower extremity 5/5.   Sensation: Intact to light touch bilaterally. some visual neglect to the left side       Fingerstick Blood Glucose: CAPILLARY BLOOD GLUCOSE  160 (29 Mar 2021 13:49)      POCT Blood Glucose.: 160 mg/dL (29 Mar 2021 13:37)    LABS:                        11.1   5.82  )-----------( 168      ( 29 Mar 2021 07:48 )             35.6         143  |  104  |  18  ----------------------------<  74  4.3   |  28  |  0.78    Ca    9.0      29 Mar 2021 07:48    TPro  6.8  /  Alb  3.9  /  TBili  0.3  /  DBili  x   /  AST  18  /  ALT  12  /  AlkPhos  125<H>      PT/INR - ( 29 Mar 2021 07:48 )   PT: 11.1 sec;   INR: 0.92          PTT - ( 29 Mar 2021 07:48 )  PTT:35.0 sec  CARDIAC MARKERS ( 29 Mar 2021 07:48 )  x     / x     / 50 U/L / x     / <1.0 ng/mL      RADIOLOGY & ADDITIONAL STUDIES:    HCT:  CT Brain Stroke Protocol (21 @ 11:13) >  IMPRESSION:    No intracranial mass effect or CT evidence of recent transcortical infarct. No acute parenchymal hemorrhage. No definite acute intracranial hemorrhage, given some limitation by the presence of residual intravascular contrast within the subarachnoid space.    CTA:  < from: CT Angio Neck w/ IV Cont (21 @ 11:15) >  IMPRESSION: Limited exam. No large vessel occlusion.    CT Angio Neck w/ IV Cont (21 @ 11:15) >    IMPRESSION: Limited exam. Mild right carotid stenosis    CTP:    CT Perfusion w/ Maps w/ IV Cont (21 @ 11:14) >    IMPRESSION: Markedly limited perfusion study with mismatch volume of 91 ml and infinite mismatch ratio.    -----------------------------------------------------------------------------------------------------------------  IV-tPA (Y/N):   no                    Reason IV-tPA not given: patient had received 8,000 units of heparin, checked ispot ACT which was 219, pTT >200, therefore, increased risk of bleeding and tPA was not administered.     ASSESSMENT/PLAN:    76 yo F w/ a PMH of HTN, HLD, DM, TIA in  (no residual deficits), PVD, Breast CA s/p chemo/radiation (on Anastrazole) s/p cardiac cath and had 3 stents placed on 3/29.  Stroke code was called about 15 minutes post cardiac cath due to confusion, difficulty following commands and getting words out. HCT negative. CTA showed no large vessel occlusion. Given that patient received heparin bolus and PTT >200, tPA was not administered. Initial NIHSS 3, however, when BP was controlled to 150/80, patient was found to have some slight left sided weakness and left sided neglect which matches decreased perfusion area along the right MCA territory. Therefore, it is possible that patient has right MCA stroke.     - Closely follow neuro checks every 1 hour  - Repeat HCT for acute changes in neuro exam  - Obtain MRI Brain without contrast  - Obtain TTE with bubble  - Goal -180  - Bedside Dysphagia Screen  - PT/OT/SLP   - Obtain Hemoglobin A1C, Lipid Profile Panel, and TSH    Secondary Stroke Prevention Medication  - continue ASA 81mg & Plavix 75mg - was loaded with ASA 325mg and Plavix 600mg this morning  - Increase atorvastatin to 80mg daily- cholesterol and stroke prevention   - Start heparin SQ and SCDs for DVT prophylaxis                      EC (ECOTRIN) 81 MG TBEC Take 81 mg by mouth every bedtime.       No current facility-administered medications for this visit.         /60 (BP Location: Left arm, Patient Position: Sitting, BP Cuff Size: Adult)   Pulse 88   Temp 36.8 °C (98.2 °F) (Temporal)   Ht 1.524 m (5')   Wt 68.5 kg (151 lb)   LMP 03/15/2005   SpO2 95%   BMI 29.49 kg/m²     Physical Exam  General: Alert and oriented, No apparent distress.   External genitilia skin is normal without erythema. Mucosa thinning present. Vaginal canal showed rectocele without any other abnormal findings or discharge. Dryness +      Assessment and Plan    1. Urinary frequency  - POCT Urinalysis did not show evidence of infection. Consistent with atrophic vaginitis with trace LE and blood.    2. Discomfort of vulva  Due to atrophy and likely friction with sexual activity. Recommended KY gel or any personal water based gel.    3. Crepitus of left TMJ on opening of jaw  Could be TMJ arthritis. It's not locking or causing issue with chewing. Monitor clinically.    4. Trigger middle finger of right hand  Tends to lock a sec without pain. Rest the finger and try topical voltaren.   - diclofenac sodium (VOLTAREN) 1 % Gel; Apply 2 g topically 4 times a day as needed (trigger joint).  Dispense: 100 g; Refill: 0        Return if symptoms worsen or fail to improve.           Signed by: Agueda Resendez M.D.      **STROKE CODE CONSULT NOTE**    Last known well time: 10:00am on 3/29    HPI:  73 yo F w/ a PMH of HTN, HLD, DM, TIA in  (no residual deficits), PVD, Breast CA s/p chemo/radiation (on Anastrazole) s/p cardiac cath and had 3 stents placed on 3/29.  Stroke code was called about 15 minutes post cardiac cath due to confusion, difficulty following commands and getting words out. Patient received 1mg of Versed and 25mg of fentanyl during the procedure and had 8,000U of heparin total, most recent bolus was 30 minutes ago. Patient was also noted to be hypertension SBP >200. Stroke code was called. Patient able to say name, however, difficult to name some objects such as color blue, fingers, phone. No other focal deficits at the time. Initial NIHSS 3.  Patient was given narcan and fluoxetine due to possible confusion from narcotics. Patient appeared to name more objects and participate in exam more at this time.     Patient was then later examined after given hydralazine and labetalol to control BP and now had some mild left sided neglect, left arm and leg 4/5. BP at the time was 150/80. Patient able to follow some commands and name some objects.     Unable to obtain ROS from patient.       T(C): --  HR: --  BP: --  RR: --  SpO2: --    PAST MEDICAL & SURGICAL HISTORY:  Breast cancer    DM (diabetes mellitus)    HLD (hyperlipidemia)    HTN (hypertension)    H/O lumpectomy    S/P dilation and curettage    S/P appendectomy    H/O:         FAMILY HISTORY:  Family history of early CAD  stent in sister      SOCIAL HISTORY:  unknown     ROS:  as per HPI    MEDICATIONS  (STANDING):  anastrozole 1 milliGRAM(s) Oral daily  aspirin enteric coated 325 milliGRAM(s) Oral Once  atorvastatin 80 milliGRAM(s) Oral at bedtime  clopidogrel Tablet 600 milliGRAM(s) Oral Once  dextrose 40% Gel 15 Gram(s) Oral Once  dextrose 5%. 1000 milliLiter(s) (50 mL/Hr) IV Continuous <Continuous>  dextrose 5%. 1000 milliLiter(s) (100 mL/Hr) IV Continuous <Continuous>  dextrose 50% Injectable 25 Gram(s) IV Push Once  dextrose 50% Injectable 12.5 Gram(s) IV Push Once  dextrose 50% Injectable 25 Gram(s) IV Push Once  glucagon  Injectable 1 milliGRAM(s) IntraMuscular Once  insulin lispro (ADMELOG) corrective regimen sliding scale   SubCutaneous Before meals and at bedtime  insulin lispro Injectable (ADMELOG) 3 Unit(s) SubCutaneous three times a day with meals  naloxone Injectable 0.4 milliGRAM(s) IV Push once  ondansetron Injectable 4 milliGRAM(s) IV Push once  sodium chloride 0.9% Bolus 250 milliLiter(s) IV Bolus once  sodium chloride 0.9%. 500 milliLiter(s) (75 mL/Hr) IV Continuous <Continuous>    MEDICATIONS  (PRN):    Allergies    NSAIDs (Swelling)    Intolerances    codeine (Vomiting)    Vital Signs Last 24 Hrs  T(C): --  T(F): --  HR: --  BP: --  BP(mean): --  RR: --  SpO2: --    Physical exam:  General: No acute distress, lethargic, opens eyes to voice    Neurologic:  -Mental status: lethargic, oriented to person. Does not state time, location or year. able to follow some commands. able to name some objection such as fingers, could not name blue, phone, ID badge.   -Cranial nerves:   II: Visual fields are full to confrontation.  III, IV, VI: Extraocular movements are intact without nystagmus. Pupils equally round and reactive to light  VII: Face is symmetric with normal eye closure and smile  Motor: Normal bulk and tone. No pronator drift. Strength bilateral upper extremity 5/5, bilateral lower extremities 5/5.  Sensation: Intact to light touch bilaterally. No neglect or extinction on double simultaneous testing.  Coordination: No dysmetria on finger-to-nose bilaterally  Reflexes: Downgoing toes bilaterally     Initial NIHSS: 3 for mental status, commands, naming  ASPECTS Score: 8    NEURO EXAM after BP was lowered to 150/80   - about 1 hour after stroke code was called  -Mental status: lethargic, oriented to person and hospital. able to follow some commands  -Cranial nerves:   III, IV, VI: Extraocular movements are intact without nystagmus. Pupils equally round and reactive to light  VII: Face is symmetric with normal eye closure and smile  Motor: Normal bulk and tone. No pronator drift. left upper extremity 4/5, left lower extremity 4/5. right upper and lower extremity 5/5.   Sensation: Intact to light touch bilaterally. some visual neglect to the left side       Fingerstick Blood Glucose: CAPILLARY BLOOD GLUCOSE  160 (29 Mar 2021 13:49)      POCT Blood Glucose.: 160 mg/dL (29 Mar 2021 13:37)    LABS:                        11.1   5.82  )-----------( 168      ( 29 Mar 2021 07:48 )             35.6         143  |  104  |  18  ----------------------------<  74  4.3   |  28  |  0.78    Ca    9.0      29 Mar 2021 07:48    TPro  6.8  /  Alb  3.9  /  TBili  0.3  /  DBili  x   /  AST  18  /  ALT  12  /  AlkPhos  125<H>      PT/INR - ( 29 Mar 2021 07:48 )   PT: 11.1 sec;   INR: 0.92          PTT - ( 29 Mar 2021 07:48 )  PTT:35.0 sec  CARDIAC MARKERS ( 29 Mar 2021 07:48 )  x     / x     / 50 U/L / x     / <1.0 ng/mL      RADIOLOGY & ADDITIONAL STUDIES:    HCT:  CT Brain Stroke Protocol (21 @ 11:13) >  IMPRESSION:    No intracranial mass effect or CT evidence of recent transcortical infarct. No acute parenchymal hemorrhage. No definite acute intracranial hemorrhage, given some limitation by the presence of residual intravascular contrast within the subarachnoid space.    CTA:  < from: CT Angio Neck w/ IV Cont (21 @ 11:15) >  IMPRESSION: Limited exam. No large vessel occlusion.    CT Angio Neck w/ IV Cont (21 @ 11:15) >    IMPRESSION: Limited exam. Mild right carotid stenosis    CTP:    CT Perfusion w/ Maps w/ IV Cont (21 @ 11:14) >    IMPRESSION: Markedly limited perfusion study with mismatch volume of 91 ml and infinite mismatch ratio.    -----------------------------------------------------------------------------------------------------------------  IV-tPA (Y/N):   no                    Reason IV-tPA not given: patient had received 8,000 units of heparin, checked ispot ACT which was 219, pTT >200, therefore, increased risk of bleeding and tPA was not administered.     ASSESSMENT/PLAN:    73 yo F w/ a PMH of HTN, HLD, DM, TIA in  (no residual deficits), PVD, Breast CA s/p chemo/radiation (on Anastrazole) s/p cardiac cath and had 3 stents placed on 3/29.  Stroke code was called about 15 minutes post cardiac cath due to confusion, difficulty following commands and getting words out. HCT negative. CTA showed no large vessel occlusion. Given that patient received heparin bolus and PTT >200, tPA was not administered. Initial NIHSS 3, however, when BP was controlled to 150/80, patient was found to have some slight left sided weakness and left sided neglect which matches decreased perfusion area along the right MCA territory. Therefore, it is possible that patient has right MCA stroke.     - Closely follow neuro checks every 1 hour  - Repeat HCT for acute changes in neuro exam  - Obtain MRI Brain without contrast  - Obtain TTE with bubble  - Goal -180  - Bedside Dysphagia Screen  - PT/OT/SLP   - Obtain Hemoglobin A1C, Lipid Profile Panel, and TSH    Secondary Stroke Prevention Medication  - continue ASA 81mg & Plavix 75mg - was loaded with ASA 325mg and Plavix 600mg this morning  - Increase atorvastatin to 80mg daily- cholesterol and stroke prevention   - Start heparin SQ and SCDs for DVT prophylaxis

## 2024-05-06 PROCEDURE — RXMED WILLOW AMBULATORY MEDICATION CHARGE: Performed by: INTERNAL MEDICINE

## 2024-05-07 ENCOUNTER — PHARMACY VISIT (OUTPATIENT)
Dept: PHARMACY | Facility: MEDICAL CENTER | Age: 69
End: 2024-05-07
Payer: COMMERCIAL

## 2024-07-08 PROCEDURE — RXMED WILLOW AMBULATORY MEDICATION CHARGE: Performed by: INTERNAL MEDICINE

## 2024-07-11 ENCOUNTER — PHARMACY VISIT (OUTPATIENT)
Dept: PHARMACY | Facility: MEDICAL CENTER | Age: 69
End: 2024-07-11
Payer: COMMERCIAL

## 2024-08-13 ENCOUNTER — PHARMACY VISIT (OUTPATIENT)
Dept: PHARMACY | Facility: MEDICAL CENTER | Age: 69
End: 2024-08-13
Payer: COMMERCIAL

## 2024-08-13 DIAGNOSIS — B35.1 ONYCHOMYCOSIS: ICD-10-CM

## 2024-08-13 PROCEDURE — RXMED WILLOW AMBULATORY MEDICATION CHARGE: Performed by: INTERNAL MEDICINE

## 2024-08-13 RX ORDER — CICLOPIROX 80 MG/ML
SOLUTION TOPICAL
Qty: 19.8 ML | Refills: 1 | Status: SHIPPED | OUTPATIENT
Start: 2024-08-13

## 2024-08-19 ENCOUNTER — PATIENT MESSAGE (OUTPATIENT)
Dept: MEDICAL GROUP | Facility: MEDICAL CENTER | Age: 69
End: 2024-08-19
Payer: MEDICARE

## 2024-08-19 DIAGNOSIS — B35.1 ONYCHOMYCOSIS: ICD-10-CM

## 2024-08-19 PROCEDURE — RXMED WILLOW AMBULATORY MEDICATION CHARGE: Performed by: INTERNAL MEDICINE

## 2024-08-21 ENCOUNTER — PHARMACY VISIT (OUTPATIENT)
Dept: PHARMACY | Facility: MEDICAL CENTER | Age: 69
End: 2024-08-21
Payer: COMMERCIAL

## 2024-09-06 RX ORDER — CICLOPIROX 80 MG/ML
SOLUTION TOPICAL
Qty: 6 ML | Refills: 3 | Status: SHIPPED | OUTPATIENT
Start: 2024-09-06

## 2024-09-24 ENCOUNTER — APPOINTMENT (OUTPATIENT)
Dept: MEDICAL GROUP | Facility: MEDICAL CENTER | Age: 69
End: 2024-09-24
Payer: MEDICARE

## 2024-10-01 ENCOUNTER — APPOINTMENT (OUTPATIENT)
Dept: MEDICAL GROUP | Facility: MEDICAL CENTER | Age: 69
End: 2024-10-01
Payer: MEDICARE

## 2024-10-01 VITALS
WEIGHT: 145.2 LBS | HEIGHT: 60 IN | SYSTOLIC BLOOD PRESSURE: 112 MMHG | OXYGEN SATURATION: 97 % | BODY MASS INDEX: 28.51 KG/M2 | TEMPERATURE: 98.5 F | DIASTOLIC BLOOD PRESSURE: 58 MMHG | HEART RATE: 70 BPM

## 2024-10-01 DIAGNOSIS — I10 PRIMARY HYPERTENSION: ICD-10-CM

## 2024-10-01 DIAGNOSIS — E78.5 HYPERLIPIDEMIA, UNSPECIFIED HYPERLIPIDEMIA TYPE: ICD-10-CM

## 2024-10-01 DIAGNOSIS — B35.1 ONYCHOMYCOSIS: ICD-10-CM

## 2024-10-01 DIAGNOSIS — Z00.00 ENCOUNTER FOR PREVENTIVE CARE: ICD-10-CM

## 2024-10-01 DIAGNOSIS — Z12.31 SCREENING MAMMOGRAM FOR BREAST CANCER: ICD-10-CM

## 2024-10-01 DIAGNOSIS — R73.03 PREDIABETES: ICD-10-CM

## 2024-10-01 PROCEDURE — 99214 OFFICE O/P EST MOD 30 MIN: CPT | Performed by: STUDENT IN AN ORGANIZED HEALTH CARE EDUCATION/TRAINING PROGRAM

## 2024-10-01 PROCEDURE — 3074F SYST BP LT 130 MM HG: CPT | Performed by: STUDENT IN AN ORGANIZED HEALTH CARE EDUCATION/TRAINING PROGRAM

## 2024-10-01 PROCEDURE — 3078F DIAST BP <80 MM HG: CPT | Performed by: STUDENT IN AN ORGANIZED HEALTH CARE EDUCATION/TRAINING PROGRAM

## 2024-10-01 ASSESSMENT — PATIENT HEALTH QUESTIONNAIRE - PHQ9: CLINICAL INTERPRETATION OF PHQ2 SCORE: 0

## 2024-10-01 ASSESSMENT — FIBROSIS 4 INDEX: FIB4 SCORE: 1.17

## 2024-11-08 PROCEDURE — RXMED WILLOW AMBULATORY MEDICATION CHARGE: Performed by: INTERNAL MEDICINE

## 2024-11-12 ENCOUNTER — PHARMACY VISIT (OUTPATIENT)
Dept: PHARMACY | Facility: MEDICAL CENTER | Age: 69
End: 2024-11-12
Payer: COMMERCIAL

## 2024-11-21 ENCOUNTER — HOSPITAL ENCOUNTER (OUTPATIENT)
Dept: LAB | Facility: MEDICAL CENTER | Age: 69
End: 2024-11-21
Attending: STUDENT IN AN ORGANIZED HEALTH CARE EDUCATION/TRAINING PROGRAM
Payer: MEDICARE

## 2024-11-21 DIAGNOSIS — Z00.00 ENCOUNTER FOR PREVENTIVE CARE: ICD-10-CM

## 2024-11-21 LAB
25(OH)D3 SERPL-MCNC: 26 NG/ML (ref 30–100)
ALBUMIN SERPL BCP-MCNC: 4.1 G/DL (ref 3.2–4.9)
ALBUMIN/GLOB SERPL: 1.5 G/DL
ALP SERPL-CCNC: 93 U/L (ref 30–99)
ALT SERPL-CCNC: 20 U/L (ref 2–50)
ANION GAP SERPL CALC-SCNC: 11 MMOL/L (ref 7–16)
AST SERPL-CCNC: 21 U/L (ref 12–45)
BASOPHILS # BLD AUTO: 0.3 % (ref 0–1.8)
BASOPHILS # BLD: 0.02 K/UL (ref 0–0.12)
BILIRUB SERPL-MCNC: 0.5 MG/DL (ref 0.1–1.5)
BUN SERPL-MCNC: 10 MG/DL (ref 8–22)
CALCIUM ALBUM COR SERPL-MCNC: 9 MG/DL (ref 8.5–10.5)
CALCIUM SERPL-MCNC: 9.1 MG/DL (ref 8.4–10.2)
CHLORIDE SERPL-SCNC: 107 MMOL/L (ref 96–112)
CHOLEST SERPL-MCNC: 211 MG/DL (ref 100–199)
CO2 SERPL-SCNC: 23 MMOL/L (ref 20–33)
CREAT SERPL-MCNC: 0.63 MG/DL (ref 0.5–1.4)
EOSINOPHIL # BLD AUTO: 0.16 K/UL (ref 0–0.51)
EOSINOPHIL NFR BLD: 2.8 % (ref 0–6.9)
ERYTHROCYTE [DISTWIDTH] IN BLOOD BY AUTOMATED COUNT: 41.1 FL (ref 35.9–50)
EST. AVERAGE GLUCOSE BLD GHB EST-MCNC: 160 MG/DL
FASTING STATUS PATIENT QL REPORTED: NORMAL
GFR SERPLBLD CREATININE-BSD FMLA CKD-EPI: 96 ML/MIN/1.73 M 2
GLOBULIN SER CALC-MCNC: 2.7 G/DL (ref 1.9–3.5)
GLUCOSE SERPL-MCNC: 116 MG/DL (ref 65–99)
HBA1C MFR BLD: 7.2 % (ref 4–5.6)
HCT VFR BLD AUTO: 45.8 % (ref 37–47)
HDLC SERPL-MCNC: 73 MG/DL
HGB BLD-MCNC: 15.2 G/DL (ref 12–16)
IMM GRANULOCYTES # BLD AUTO: 0.01 K/UL (ref 0–0.11)
IMM GRANULOCYTES NFR BLD AUTO: 0.2 % (ref 0–0.9)
LDLC SERPL CALC-MCNC: 116 MG/DL
LYMPHOCYTES # BLD AUTO: 2.29 K/UL (ref 1–4.8)
LYMPHOCYTES NFR BLD: 39.6 % (ref 22–41)
MCH RBC QN AUTO: 30.2 PG (ref 27–33)
MCHC RBC AUTO-ENTMCNC: 33.2 G/DL (ref 32.2–35.5)
MCV RBC AUTO: 90.9 FL (ref 81.4–97.8)
MONOCYTES # BLD AUTO: 0.37 K/UL (ref 0–0.85)
MONOCYTES NFR BLD AUTO: 6.4 % (ref 0–13.4)
NEUTROPHILS # BLD AUTO: 2.94 K/UL (ref 1.82–7.42)
NEUTROPHILS NFR BLD: 50.7 % (ref 44–72)
NRBC # BLD AUTO: 0 K/UL
NRBC BLD-RTO: 0 /100 WBC (ref 0–0.2)
PLATELET # BLD AUTO: 278 K/UL (ref 164–446)
PMV BLD AUTO: 8.3 FL (ref 9–12.9)
POTASSIUM SERPL-SCNC: 4 MMOL/L (ref 3.6–5.5)
PROT SERPL-MCNC: 6.8 G/DL (ref 6–8.2)
RBC # BLD AUTO: 5.04 M/UL (ref 4.2–5.4)
SODIUM SERPL-SCNC: 141 MMOL/L (ref 135–145)
TRIGL SERPL-MCNC: 108 MG/DL (ref 0–149)
WBC # BLD AUTO: 5.8 K/UL (ref 4.8–10.8)

## 2024-11-21 PROCEDURE — 36415 COLL VENOUS BLD VENIPUNCTURE: CPT

## 2024-11-21 PROCEDURE — 82306 VITAMIN D 25 HYDROXY: CPT

## 2024-11-21 PROCEDURE — 85025 COMPLETE CBC W/AUTO DIFF WBC: CPT

## 2024-11-21 PROCEDURE — 80053 COMPREHEN METABOLIC PANEL: CPT

## 2024-11-21 PROCEDURE — 83036 HEMOGLOBIN GLYCOSYLATED A1C: CPT

## 2024-11-21 PROCEDURE — 80061 LIPID PANEL: CPT

## 2024-12-05 ENCOUNTER — OFFICE VISIT (OUTPATIENT)
Dept: MEDICAL GROUP | Facility: MEDICAL CENTER | Age: 69
End: 2024-12-05
Payer: MEDICARE

## 2024-12-05 VITALS
SYSTOLIC BLOOD PRESSURE: 126 MMHG | DIASTOLIC BLOOD PRESSURE: 66 MMHG | HEART RATE: 70 BPM | WEIGHT: 147 LBS | HEIGHT: 60 IN | OXYGEN SATURATION: 96 % | TEMPERATURE: 98.8 F | BODY MASS INDEX: 28.86 KG/M2

## 2024-12-05 DIAGNOSIS — Z79.4 TYPE 2 DIABETES MELLITUS WITHOUT COMPLICATION, WITH LONG-TERM CURRENT USE OF INSULIN (HCC): ICD-10-CM

## 2024-12-05 DIAGNOSIS — B35.1 ONYCHOMYCOSIS: ICD-10-CM

## 2024-12-05 DIAGNOSIS — E11.9 TYPE 2 DIABETES MELLITUS WITHOUT COMPLICATION, WITH LONG-TERM CURRENT USE OF INSULIN (HCC): ICD-10-CM

## 2024-12-05 PROCEDURE — 3078F DIAST BP <80 MM HG: CPT | Performed by: STUDENT IN AN ORGANIZED HEALTH CARE EDUCATION/TRAINING PROGRAM

## 2024-12-05 PROCEDURE — 99214 OFFICE O/P EST MOD 30 MIN: CPT | Performed by: STUDENT IN AN ORGANIZED HEALTH CARE EDUCATION/TRAINING PROGRAM

## 2024-12-05 PROCEDURE — 3074F SYST BP LT 130 MM HG: CPT | Performed by: STUDENT IN AN ORGANIZED HEALTH CARE EDUCATION/TRAINING PROGRAM

## 2024-12-05 ASSESSMENT — ENCOUNTER SYMPTOMS
COUGH: 0
PALPITATIONS: 0
FEVER: 0
HEMOPTYSIS: 0
CHILLS: 0

## 2024-12-05 ASSESSMENT — FIBROSIS 4 INDEX: FIB4 SCORE: 1.17

## 2024-12-06 NOTE — PROGRESS NOTES
Hilda Hui is a 69 y.o. old female  who is here for lab follow up    Chief Complaint   Patient presents with    Follow-Up     Go over labs     Other     Toe fungus         HPI  Patient's lab report shows worsening A1c.  She has tried metformin in the past. She would like to try lifestyle modifications before restarting on metformin.  She admits she has not been compliant with healthy diet recently.  She has toe fungus and has been using ciclopirox and would like to continue it.  She would like to hold off on oral medications at this time.       Review of Systems   Constitutional:  Negative for chills and fever.   Respiratory:  Negative for cough and hemoptysis.    Cardiovascular:  Negative for chest pain and palpitations.        She  has a past medical history of Asthma, Breast cancer (HCC), Glenoid labrum tear (11/7/2011), HTN (hypertension) (10/3/2011), breast cancer (10/3/2011), Hyperlipidemia (10/3/2011), Tendonitis of shoulder (11/7/2011), and Type II or unspecified type diabetes mellitus without mention of complication, not stated as uncontrolled (10/3/2011).  She  has a past surgical history that includes mastectomy modified radical; breast reconstruction; breast implant removal; dilation and curettage; mastectomy (01/01/2002); and cataract extraction with iol (Bilateral, 2020).  Family History   Problem Relation Age of Onset    Cancer Mother         pancreatic    Stroke Mother     Cancer Father         prostate cancer    Lung Disease Father         emphysema    Arthritis Daughter         Ank. Spond    Cancer Maternal Aunt         breast cancer     Social History     Tobacco Use    Smoking status: Never    Smokeless tobacco: Never   Vaping Use    Vaping status: Never Used   Substance Use Topics    Alcohol use: No     Comment: Rarely    Drug use: No     Patient Active Problem List    Diagnosis Date Noted    Type 2 diabetes mellitus without complication, with long-term current use of  insulin (HCC) 12/05/2024    Onychomycosis 10/01/2024    Prediabetes 07/05/2020    History of breast cancer 10/03/2011    Primary hypertension 10/03/2011    Hyperlipidemia 10/03/2011     Current Outpatient Medications   Medication Sig Dispense Refill    Nutritional Supplements (GLUCERNA SHAKE) Liquid Take 1 Can by mouth every 48 hours. 237 mL 2    ciclopirox (PENLAC) 8 % solution Apply to left big toenail once daily at bedtime x 90 days 6 mL 3    diclofenac sodium (VOLTAREN) 1 % Gel Apply 2 g topically 4 times a day as needed (trigger joint). 100 g 0    injection RSVPreF3 Vac Recomb Adjuvanted (AREXVY) 120 MCG/0.5ML Recon Susp Inject 0.5 mL into the shoulder, thigh, or buttocks. 0.5 mL 0    terbinafine (LAMISIL) 250 MG Tab Take 1 Tablet by mouth every day. 90 Tablet 0    glucose blood strip Use 1 Strip by Other route in the morning, at noon, and at bedtime. 100 Strip 3    enalapril (VASOTEC) 10 MG Tab TAKE 1 TABLET BY MOUTH EVERY DAY 90 Tablet 3    atorvastatin (LIPITOR) 80 MG tablet TAKE 1 TABLET BY MOUTH EVERY DAY IN THE EVENING 90 Tablet 3    ONETOUCH ULTRA strip USE TO TEST BLOOD SUGAR ONCE A  Strip 3    docusate sodium (COLACE) 100 MG Cap Take 300 mg by mouth every day.      Cyanocobalamin (VITAMIN B-12 PO) Take 1 Tab by mouth every day.      therapeutic multivitamin-minerals (THERAGRAN-M) Tab Take 1 Tab by mouth every day.      Calcium Carb-Cholecalciferol (CALCIUM 600+D3 PO) Take 1 Tab by mouth every morning.      aspirin EC (ECOTRIN) 81 MG TBEC Take 81 mg by mouth every bedtime.       No current facility-administered medications for this visit.    (including changes today)  Allergies: Asa [aspirin]    /66 (BP Location: Right arm, Patient Position: Sitting, BP Cuff Size: Adult)   Pulse 70   Temp 37.1 °C (98.8 °F) (Temporal)   Ht 1.524 m (5')   Wt 66.7 kg (147 lb)   SpO2 96%      Physical Exam  Constitutional:       Appearance: Normal appearance.   Cardiovascular:      Rate and Rhythm: Normal  rate and regular rhythm.      Pulses: Normal pulses.      Heart sounds: Normal heart sounds. No murmur heard.  Pulmonary:      Effort: Pulmonary effort is normal. No respiratory distress.      Breath sounds: Normal breath sounds.   Neurological:      General: No focal deficit present.      Mental Status: She is alert and oriented to person, place, and time.          Lab Results   Component Value Date/Time    HBA1C 7.2 (H) 11/21/2024 06:40 AM      Lab Results   Component Value Date/Time    CHOLSTRLTOT 211 (H) 11/21/2024 0640    TRIGLYCERIDE 108 11/21/2024 0640    HDL 73 11/21/2024 0640     (H) 11/21/2024 0640         Assessment and plan:    Problem List Items Addressed This Visit       Onychomycosis     - Refill of ciclopirox topical has been provided.  Discussed starting on terbinafine, patient would like to hold off at this time.         Type 2 diabetes mellitus without complication, with long-term current use of insulin (HCC)     - Patient is well aware of her medical conditions and would like to continue diet and lifestyle modifications at this time before restarting on medication.  We will obtain repeat A1c 3 months later to monitor.         Relevant Orders    HEMOGLOBIN A1C          Return if symptoms worsen or fail to improve, for Labfollowup, Diabetes.        Please note that this dictation was created using voice recognition software. I have made every reasonable attempt to correct obvious errors, but I expect that there are errors of grammar and possibly content that I did not discover before finalizing the note.

## 2024-12-06 NOTE — ASSESSMENT & PLAN NOTE
- Patient is well aware of her medical conditions and would like to continue diet and lifestyle modifications at this time before restarting on medication.  We will obtain repeat A1c 3 months later to monitor.

## 2024-12-06 NOTE — ASSESSMENT & PLAN NOTE
- Refill of ciclopirox topical has been provided.  Discussed starting on terbinafine, patient would like to hold off at this time.

## 2024-12-23 ENCOUNTER — APPOINTMENT (OUTPATIENT)
Dept: RADIOLOGY | Facility: MEDICAL CENTER | Age: 69
End: 2024-12-23
Attending: STUDENT IN AN ORGANIZED HEALTH CARE EDUCATION/TRAINING PROGRAM
Payer: MEDICARE

## 2024-12-23 DIAGNOSIS — Z12.31 SCREENING MAMMOGRAM FOR BREAST CANCER: ICD-10-CM

## 2024-12-23 PROCEDURE — 77063 BREAST TOMOSYNTHESIS BI: CPT | Mod: 52

## 2025-01-02 ENCOUNTER — PHARMACY VISIT (OUTPATIENT)
Dept: PHARMACY | Facility: MEDICAL CENTER | Age: 70
End: 2025-01-02
Payer: COMMERCIAL

## 2025-01-02 PROCEDURE — RXMED WILLOW AMBULATORY MEDICATION CHARGE: Performed by: INTERNAL MEDICINE

## 2025-01-03 DIAGNOSIS — R73.03 PREDIABETES: ICD-10-CM

## 2025-01-03 PROCEDURE — RXMED WILLOW AMBULATORY MEDICATION CHARGE: Performed by: FAMILY MEDICINE

## 2025-01-03 RX ORDER — BLOOD SUGAR DIAGNOSTIC
1 STRIP MISCELLANEOUS 3 TIMES DAILY
Qty: 100 STRIP | Refills: 0 | Status: SHIPPED | OUTPATIENT
Start: 2025-01-03

## 2025-01-03 NOTE — TELEPHONE ENCOUNTER
Received request via: Pharmacy    Was the patient seen in the last year in this department? Yes    Does the patient have an active prescription (recently filled or refills available) for medication(s) requested? No    Pharmacy Name: [facility]     Does the patient have snf Plus and need 100-day supply? (This applies to ALL medications) Yes, quantity updated to 100 days

## 2025-01-07 ENCOUNTER — PHARMACY VISIT (OUTPATIENT)
Dept: PHARMACY | Facility: MEDICAL CENTER | Age: 70
End: 2025-01-07
Payer: COMMERCIAL

## 2025-02-28 DIAGNOSIS — R73.03 PREDIABETES: ICD-10-CM

## 2025-03-03 PROCEDURE — RXMED WILLOW AMBULATORY MEDICATION CHARGE: Performed by: INTERNAL MEDICINE

## 2025-03-04 ENCOUNTER — PHARMACY VISIT (OUTPATIENT)
Dept: PHARMACY | Facility: MEDICAL CENTER | Age: 70
End: 2025-03-04
Payer: COMMERCIAL

## 2025-03-04 RX ORDER — BLOOD SUGAR DIAGNOSTIC
1 STRIP MISCELLANEOUS 3 TIMES DAILY
Qty: 100 STRIP | Refills: 0 | Status: SHIPPED | OUTPATIENT
Start: 2025-03-04

## 2025-03-04 NOTE — TELEPHONE ENCOUNTER
Received request via: Pharmacy    Was the patient seen in the last year in this department? Yes    Does the patient have an active prescription (recently filled or refills available) for medication(s) requested? No    [facility]     Does the patient have [facility] Plus and need 100-day supply? (This applies to ALL medications) Patient does not have SCP

## 2025-03-15 ENCOUNTER — HOSPITAL ENCOUNTER (OUTPATIENT)
Facility: MEDICAL CENTER | Age: 70
End: 2025-03-15
Attending: STUDENT IN AN ORGANIZED HEALTH CARE EDUCATION/TRAINING PROGRAM
Payer: MEDICARE

## 2025-03-15 DIAGNOSIS — E11.9 TYPE 2 DIABETES MELLITUS WITHOUT COMPLICATION, WITH LONG-TERM CURRENT USE OF INSULIN (HCC): ICD-10-CM

## 2025-03-15 DIAGNOSIS — Z79.4 TYPE 2 DIABETES MELLITUS WITHOUT COMPLICATION, WITH LONG-TERM CURRENT USE OF INSULIN (HCC): ICD-10-CM

## 2025-03-15 LAB
EST. AVERAGE GLUCOSE BLD GHB EST-MCNC: 134 MG/DL
HBA1C MFR BLD: 6.3 % (ref 4–5.6)

## 2025-03-15 PROCEDURE — 36415 COLL VENOUS BLD VENIPUNCTURE: CPT

## 2025-03-15 PROCEDURE — 83036 HEMOGLOBIN GLYCOSYLATED A1C: CPT

## 2025-03-20 ENCOUNTER — RESULTS FOLLOW-UP (OUTPATIENT)
Dept: MEDICAL GROUP | Facility: MEDICAL CENTER | Age: 70
End: 2025-03-20

## 2025-03-24 ENCOUNTER — PHARMACY VISIT (OUTPATIENT)
Dept: PHARMACY | Facility: MEDICAL CENTER | Age: 70
End: 2025-03-24
Payer: COMMERCIAL

## 2025-03-24 PROCEDURE — RXMED WILLOW AMBULATORY MEDICATION CHARGE: Performed by: STUDENT IN AN ORGANIZED HEALTH CARE EDUCATION/TRAINING PROGRAM

## 2025-04-15 ENCOUNTER — RESULTS FOLLOW-UP (OUTPATIENT)
Dept: MEDICAL GROUP | Facility: MEDICAL CENTER | Age: 70
End: 2025-04-15

## 2025-04-15 ENCOUNTER — OFFICE VISIT (OUTPATIENT)
Dept: MEDICAL GROUP | Facility: MEDICAL CENTER | Age: 70
End: 2025-04-15
Payer: MEDICARE

## 2025-04-15 VITALS
HEIGHT: 65 IN | TEMPERATURE: 98.2 F | DIASTOLIC BLOOD PRESSURE: 62 MMHG | WEIGHT: 138.89 LBS | OXYGEN SATURATION: 94 % | BODY MASS INDEX: 23.14 KG/M2 | SYSTOLIC BLOOD PRESSURE: 114 MMHG | HEART RATE: 86 BPM

## 2025-04-15 DIAGNOSIS — E11.9 TYPE 2 DIABETES MELLITUS WITHOUT COMPLICATION, WITH LONG-TERM CURRENT USE OF INSULIN (HCC): ICD-10-CM

## 2025-04-15 DIAGNOSIS — J40 BRONCHITIS: ICD-10-CM

## 2025-04-15 DIAGNOSIS — R05.1 ACUTE COUGH: ICD-10-CM

## 2025-04-15 DIAGNOSIS — Z79.4 TYPE 2 DIABETES MELLITUS WITHOUT COMPLICATION, WITH LONG-TERM CURRENT USE OF INSULIN (HCC): ICD-10-CM

## 2025-04-15 LAB
FLUAV RNA SPEC QL NAA+PROBE: NEGATIVE
FLUBV RNA SPEC QL NAA+PROBE: NEGATIVE
RSV RNA SPEC QL NAA+PROBE: NEGATIVE
SARS-COV-2 RNA RESP QL NAA+PROBE: NEGATIVE

## 2025-04-15 PROCEDURE — 0241U POCT CEPHEID COV-2, FLU A/B, RSV - PCR: CPT | Performed by: STUDENT IN AN ORGANIZED HEALTH CARE EDUCATION/TRAINING PROGRAM

## 2025-04-15 PROCEDURE — 99214 OFFICE O/P EST MOD 30 MIN: CPT | Performed by: STUDENT IN AN ORGANIZED HEALTH CARE EDUCATION/TRAINING PROGRAM

## 2025-04-15 PROCEDURE — 3074F SYST BP LT 130 MM HG: CPT | Performed by: STUDENT IN AN ORGANIZED HEALTH CARE EDUCATION/TRAINING PROGRAM

## 2025-04-15 PROCEDURE — 3078F DIAST BP <80 MM HG: CPT | Performed by: STUDENT IN AN ORGANIZED HEALTH CARE EDUCATION/TRAINING PROGRAM

## 2025-04-15 RX ORDER — ALBUTEROL SULFATE 90 UG/1
2 INHALANT RESPIRATORY (INHALATION) EVERY 4 HOURS PRN
Qty: 8 G | Refills: 0 | Status: SHIPPED | OUTPATIENT
Start: 2025-04-15 | End: 2025-04-29

## 2025-04-15 RX ORDER — ALBUTEROL SULFATE 90 UG/1
2 INHALANT RESPIRATORY (INHALATION) EVERY 4 HOURS PRN
Qty: 8.5 G | Refills: 0 | Status: SHIPPED | OUTPATIENT
Start: 2025-04-15 | End: 2025-04-15 | Stop reason: SDUPTHER

## 2025-04-15 RX ORDER — ALBUTEROL SULFATE 90 UG/1
2 INHALANT RESPIRATORY (INHALATION) EVERY 4 HOURS PRN
Qty: 1 EACH | Refills: 0 | Status: SHIPPED | OUTPATIENT
Start: 2025-04-15 | End: 2025-04-15 | Stop reason: SDUPTHER

## 2025-04-15 ASSESSMENT — ENCOUNTER SYMPTOMS
COUGH: 1
ABDOMINAL PAIN: 0
WHEEZING: 1
FEVER: 0
VOMITING: 0
DIARRHEA: 0
HEMOPTYSIS: 0
PALPITATIONS: 0
CHILLS: 0

## 2025-04-15 ASSESSMENT — FIBROSIS 4 INDEX: FIB4 SCORE: 1.17

## 2025-04-15 ASSESSMENT — PATIENT HEALTH QUESTIONNAIRE - PHQ9: CLINICAL INTERPRETATION OF PHQ2 SCORE: 0

## 2025-04-15 NOTE — LETTER
[facility]  Niurka Silva M.D.  [address]  [place] [address]  Fax: [phone]   Authorization for Release/Disclosure of   Protected Health Information   Name: HILDA CARTER PRICILLA : 1955 SSN: [id number]   Address: [address] [place] [address] [place] [address] Phone:    There are no phone numbers on file.   I authorize the entity listed below to release/disclose the PHI below to:   [facility]/Nuirka Silva M.D. and Niurka Silva M.D.   Provider or Entity Name:  Dr. Daniel welch   Address   City, State, [facility]   Phone:      Fax:     Reason for request: continuity of care   Information to be released:    [  ] LAST COLONOSCOPY,  including any PATH REPORT and follow-up  [  ] LAST FIT/COLOGUARD RESULT [  ] LAST DEXA  [  ] LAST MAMMOGRAM  [  ] LAST PAP  [  ] LAST LABS [  ] RETINA EXAM REPORT  [  ] IMMUNIZATION RECORDS  [  ] Release all info      [  ] Check here and initial the line next to each item to release ALL health information INCLUDING  _____ Care and treatment for drug and / or alcohol abuse  _____ HIV testing, infection status, or AIDS  _____ Genetic Testing    DATES OF SERVICE OR TIME PERIOD TO BE DISCLOSED: _____________  I understand and acknowledge that:  * This Authorization may be revoked at any time by you in writing, except if your health information has already been used or disclosed.  * Your health information that will be used or disclosed as a result of you signing this authorization could be re-disclosed by the recipient. If this occurs, your re-disclosed health information may no longer be protected by State or Federal laws.  * You may refuse to sign this Authorization. Your refusal will not affect your ability to obtain treatment.  * This Authorization becomes effective upon signing and will  on (date) __________.      If no date is indicated, this Authorization will  one (1) year from the signature date.    Name: Hilda Carter  Jose  Signature: Date:   4/15/2025     PLEASE FAX REQUESTED RECORDS BACK TO: [phone]

## 2025-04-15 NOTE — PROGRESS NOTES
"Verbal consent was acquired by the patient to use Flooved ambient listening note generation during this visit     Subjective:     HPI:   History of Present Illness  The patient presents for evaluation of cough, cold, asthma    She began experiencing a cough on Saturday night. By Sunday, she developed a cold but still no cough. She has been managing her symptoms with Tylenol and Vicks cough syrup. She reports no sore throat but admits to a recent exposure to her grandchildren who were ill. She experienced a fever peaking at 100 degrees, which has since resolved. She also reports the production of green phlegm last night and is inquiring about the need for antibiotics. She is currently consuming Diet Sprite and NyQuil every 4 hours, which does not induce drowsiness.    She has a history of asthma, diagnosed in the [facility], and has been experiencing intermittent wheezing and shortness of breath, which she attributes to her cough.           Objective:     Exam:  /62 (BP Location: Right arm, Patient Position: Sitting, BP Cuff Size: Adult)   Pulse 86   Temp 36.8 °C (98.2 °F) (Temporal)   Ht 1.651 m (5' 5\")   Wt 63 kg (138 lb 14.2 oz)   LMP 03/15/2005   SpO2 94%   BMI 23.11 kg/m²  Body mass index is 23.11 kg/m².    Review of Systems   Constitutional:  Negative for chills and fever.   Respiratory:  Positive for cough and wheezing (occassional). Negative for hemoptysis.    Cardiovascular:  Negative for chest pain and palpitations.   Gastrointestinal:  Negative for abdominal pain, diarrhea and vomiting.       Physical Exam  Constitutional:       Appearance: Normal appearance.   Cardiovascular:      Rate and Rhythm: Normal rate and regular rhythm.      Pulses: Normal pulses.      Heart sounds: Normal heart sounds. No murmur heard.  Pulmonary:      Effort: Pulmonary effort is normal. No respiratory distress.      Breath sounds: Wheezing present.   Neurological:      Mental Status: She is alert and oriented " to person, place, and time.           Lab studies     Latest Reference Range & Units 03/15/25 07:04   Glycohemoglobin 4.0 - 5.6 % 6.3 (H)   (H): Data is abnormally high    Assessment & Plan:     Assessment & Plan      Problem List Items Addressed This Visit       Type 2 diabetes mellitus without complication, with long-term current use of insulin ([facility])    Relevant Orders    Diabetic Monofilament LE Exam (Completed)    Microalbumin Creat Ratio Urine (Lab Collect)    Bronchitis    - Symptoms have been present for less than 5 days, suggesting a viral etiology.  - No current fever; previously had a fever of 100°F, now 98°F.  - A swab test will be conducted to screen for influenza, RSV, and COVID-19.  If positive for COVID-19 or RSV, Flu appropriate treatment will be initiated.  - Advised to continue using over-the-counter cough syrups such as NyQuil or DayQuil, rest, and hydrate with electrolytes.  - An albuterol inhaler will be prescribed to manage wheezing. If wheezing persists, a course of prednisone may be considered.         Relevant Medications    albuterol 108 (90 Base) MCG/ACT Aero Soln inhalation aerosol     Other Visit Diagnoses         Acute cough        Relevant Medications    albuterol 108 (90 Base) MCG/ACT Aero Soln inhalation aerosol    Other Relevant Orders    POCT CoV-2, Flu A/B, RSV by PCR (Completed)            HCC Gap Form    Last edited 04/15/25 17:55 PDT by Niurka Silva M.D.         Return if symptoms worsen or fail to improve.    Please note that this dictation was created using voice recognition software. I have made every reasonable attempt to correct obvious errors, but I expect that there are errors of grammar and possibly content that I did not discover before finalizing the note.

## 2025-04-16 NOTE — ASSESSMENT & PLAN NOTE
- Symptoms have been present for less than 5 days, suggesting a viral etiology.  - No current fever; previously had a fever of 100°F, now 98°F.  - A swab test will be conducted to screen for influenza, RSV, and COVID-19.  If positive for COVID-19 or RSV, Flu appropriate treatment will be initiated.  - Advised to continue using over-the-counter cough syrups such as NyQuil or DayQuil, rest, and hydrate with electrolytes.  - An albuterol inhaler will be prescribed to manage wheezing. If wheezing persists, a course of prednisone may be considered.

## 2025-04-21 DIAGNOSIS — E78.00 PURE HYPERCHOLESTEROLEMIA: ICD-10-CM

## 2025-04-21 DIAGNOSIS — I10 ESSENTIAL HYPERTENSION: ICD-10-CM

## 2025-04-22 RX ORDER — ATORVASTATIN CALCIUM 80 MG/1
80 TABLET, FILM COATED ORAL EVERY EVENING
Qty: 90 TABLET | Refills: 3 | Status: SHIPPED | OUTPATIENT
Start: 2025-04-22

## 2025-04-22 RX ORDER — ENALAPRIL MALEATE 10 MG/1
10 TABLET ORAL DAILY
Qty: 90 TABLET | Refills: 3 | Status: SHIPPED | OUTPATIENT
Start: 2025-04-22

## 2025-04-24 PROCEDURE — RXMED WILLOW AMBULATORY MEDICATION CHARGE: Performed by: STUDENT IN AN ORGANIZED HEALTH CARE EDUCATION/TRAINING PROGRAM

## 2025-04-25 ENCOUNTER — PHARMACY VISIT (OUTPATIENT)
Dept: PHARMACY | Facility: MEDICAL CENTER | Age: 70
End: 2025-04-25
Payer: COMMERCIAL

## 2025-05-21 DIAGNOSIS — R73.03 PREDIABETES: ICD-10-CM

## 2025-05-21 RX ORDER — BLOOD SUGAR DIAGNOSTIC
1 STRIP MISCELLANEOUS 3 TIMES DAILY
Qty: 100 STRIP | Refills: 0 | Status: SHIPPED | OUTPATIENT
Start: 2025-05-21

## 2025-05-27 ENCOUNTER — PHARMACY VISIT (OUTPATIENT)
Dept: PHARMACY | Facility: MEDICAL CENTER | Age: 70
End: 2025-05-27
Payer: COMMERCIAL

## 2025-05-27 PROCEDURE — RXMED WILLOW AMBULATORY MEDICATION CHARGE: Performed by: STUDENT IN AN ORGANIZED HEALTH CARE EDUCATION/TRAINING PROGRAM

## 2025-07-03 NOTE — ASSESSMENT & PLAN NOTE
Chronic, stable. Pt monitors BP at home stating she runs WNL. Denies dizziness/lightheadedness, CP, dyspnea. Continue current treatment regime: enalapril 10mg daily. Follow up with PCP annually for continued monitoring and management.

## 2025-07-03 NOTE — ASSESSMENT & PLAN NOTE
Chronic, ongoing. Maintains on statin therapy without issue. Most recent lipid panel from 11/2024 with . Continue atorvastatin 80mg daily. Recommend Mediterranean diet and regular physical activity. Follow up with PCP at least annually for continued monitoring and management.   Lab Results   Component Value Date/Time    CHOLSTRLTOT 211 (H) 11/21/2024 06:40 AM     (H) 11/21/2024 06:40 AM    HDL 73 11/21/2024 06:40 AM    TRIGLYCERIDE 108 11/21/2024 06:40 AM

## 2025-07-03 NOTE — ASSESSMENT & PLAN NOTE
Chronic, stable, diet controlled. Last A1c 6.3 as of 3/2025. Last monofilament foot exam: 2025, last urine microalb/creat: 2025, last retinal screenin2023. Continue to advise low carbohydrate diet with regular physical activity. Continue current treatment regime. Follow up with PCP as scheduled for continued monitoring and management.

## 2025-07-07 PROBLEM — R73.03 PREDIABETES: Status: RESOLVED | Noted: 2020-07-05 | Resolved: 2025-07-07

## 2025-07-08 ENCOUNTER — TELEMEDICINE (OUTPATIENT)
Dept: FAMILY PLANNING/WOMEN'S HEALTH CLINIC | Facility: PHYSICIAN GROUP | Age: 70
End: 2025-07-08
Payer: MEDICARE

## 2025-07-08 VITALS — WEIGHT: 140 LBS | BODY MASS INDEX: 23.32 KG/M2 | HEIGHT: 65 IN

## 2025-07-08 DIAGNOSIS — I10 PRIMARY HYPERTENSION: ICD-10-CM

## 2025-07-08 DIAGNOSIS — M85.88 OSTEOPENIA OF LUMBAR SPINE: ICD-10-CM

## 2025-07-08 DIAGNOSIS — E11.9 TYPE 2 DIABETES MELLITUS WITHOUT COMPLICATION, WITH LONG-TERM CURRENT USE OF INSULIN (HCC): Primary | ICD-10-CM

## 2025-07-08 DIAGNOSIS — Z79.4 TYPE 2 DIABETES MELLITUS WITHOUT COMPLICATION, WITH LONG-TERM CURRENT USE OF INSULIN (HCC): Primary | ICD-10-CM

## 2025-07-08 DIAGNOSIS — E78.5 HYPERLIPIDEMIA, UNSPECIFIED HYPERLIPIDEMIA TYPE: ICD-10-CM

## 2025-07-08 PROCEDURE — G0439 PPPS, SUBSEQ VISIT: HCPCS | Mod: 95 | Performed by: PHYSICIAN ASSISTANT

## 2025-07-08 SDOH — ECONOMIC STABILITY: FOOD INSECURITY: WITHIN THE PAST 12 MONTHS, YOU WORRIED THAT YOUR FOOD WOULD RUN OUT BEFORE YOU GOT THE MONEY TO BUY MORE.: NEVER TRUE

## 2025-07-08 SDOH — ECONOMIC STABILITY: FOOD INSECURITY: WITHIN THE PAST 12 MONTHS, THE FOOD YOU BOUGHT JUST DIDN'T LAST AND YOU DIDN'T HAVE MONEY TO GET MORE.: NEVER TRUE

## 2025-07-08 SDOH — ECONOMIC STABILITY: TRANSPORTATION INSECURITY: IN THE PAST 12 MONTHS, HAS LACK OF TRANSPORTATION KEPT YOU FROM MEDICAL APPOINTMENTS OR FROM GETTING MEDICATIONS?: NO

## 2025-07-08 SDOH — ECONOMIC STABILITY: FOOD INSECURITY: HOW HARD IS IT FOR YOU TO PAY FOR THE VERY BASICS LIKE FOOD, HOUSING, MEDICAL CARE, AND HEATING?: NOT HARD AT ALL

## 2025-07-08 ASSESSMENT — ACTIVITIES OF DAILY LIVING (ADL)
BATHING_REQUIRES_ASSISTANCE: 0
LACK_OF_TRANSPORTATION: NO

## 2025-07-08 ASSESSMENT — FIBROSIS 4 INDEX: FIB4 SCORE: 1.18

## 2025-07-08 ASSESSMENT — ENCOUNTER SYMPTOMS: GENERAL WELL-BEING: GOOD

## 2025-07-08 ASSESSMENT — PATIENT HEALTH QUESTIONNAIRE - PHQ9: CLINICAL INTERPRETATION OF PHQ2 SCORE: 0

## 2025-07-08 NOTE — PROGRESS NOTES
Virtual Visit: Comprehensive Health Assessment Program     This visit was conducted via Teams using secure and encrypted videoconferencing technology.   The patient was in their home in the state of [place].    The patient's identity was confirmed and verbal consent was obtained for this virtual visit.        Hilda Hui is a 70 y.o. presenting for her comprehensive health assessment.    Patient Active Problem List    Diagnosis Date Noted    Osteopenia of lumbar spine 07/08/2025    Bronchitis 04/15/2025    Type 2 diabetes mellitus without complication, with long-term current use of insulin (HCC) 12/05/2024    Onychomycosis 10/01/2024    History of breast cancer 10/03/2011    Primary hypertension 10/03/2011    Hyperlipidemia 10/03/2011       Current Medications[1]       Current supplements as per medication list.     Allergies:   Asa [aspirin]  Social History[2]  Family History   Problem Relation Age of Onset    Cancer Mother         pancreatic    Stroke Mother     Cancer Father         prostate cancer    Lung Disease Father         emphysema    Arthritis Daughter         Ank. Spond    Cancer Maternal Aunt         breast cancer     Hilda  has a past medical history of Asthma, Breast cancer (HCC), Glenoid labrum tear (11/7/2011), HTN (hypertension) (10/3/2011), breast cancer (10/3/2011), Hyperlipidemia (10/3/2011), Tendonitis of shoulder (11/7/2011), and Type II or unspecified type diabetes mellitus without mention of complication, not stated as uncontrolled (10/3/2011).   Past Surgical History[3]    Screening:  In the last six months have you experienced any leakage of urine? No    Depression Screening  Little interest or pleasure in doing things?  0 - not at all  Feeling down, depressed , or hopeless? 0 - not at all  Patient Health Questionnaire Score: 0     If depressive symptoms identified deferred to follow up visit unless specifically addressed in assessment and  plan.    Interpretation of PHQ-9 Total Score   Score Severity   1-4 No Depression   5-9 Mild Depression   10-14 Moderate Depression   15-19 Moderately Severe Depression   20-27 Severe Depression    Screening for Cognitive Impairment  Do you or any of your friends or family members have any concern about your memory? No  Three Minute Recall (Village, Kitchen, Baby)  /3    Anil clock face with all 12 numbers and set the hands to show 10 minutes past 11.  Yes    Cognitive concerns identified deferred for follow up unless specifically addressed in assessment and plan.    Fall Risk Assessment  Has the patient had two or more falls in the last year or any fall with injury in the last year?  No    Safety Assessment  Do you always wear your seatbelt?  Yes  Any changes to home needed to function safely? No  Difficulty hearing.  No  Patient counseled about all safety risks that were identified.    Functional Assessment ADLs  Are there any barriers preventing you from cooking for yourself or meeting nutritional needs?  No.    Are there any barriers preventing you from driving safely or obtaining transportation?  No.    Are there any barriers preventing you from using a telephone or calling for help?  No    Are there any barriers preventing you from shopping?  No.    Are there any barriers preventing you from taking care of your own finances?  No    Are there any barriers preventing you from managing your medications?  No    Are there any barriers preventing you from showering, bathing or dressing yourself? No    Are there any barriers preventing you from doing housework or laundry? No  Are there any barriers preventing you from using the toilet?No  Are you currently engaging in any exercise or physical activity?  Yes. Walking      Self-Assessment of Health  What is your perception of your health? Good    Do you sleep more than six hours a night? Yes    In the past 7 days, how much did pain keep you from doing your normal work?  None    Do you spend quality time with family or friends (virtually or in person)? Yes    Do you usually eat a heart healthy diet that constists of a variety of fruits, vegetables, whole grains and fiber? Yes    Do you eat foods high in fat and/or Fast Food more than three times per week? No    How concerned are you that your medical conditions are not being well managed? Not at all    Are you worried that in the next 2 months, you may not have stable housing that you own, rent, or stay in as part of a household? Choose not to answer      Advance Care Planning  Do you have an Advance Directive, Living Will, Durable Power of , or POLST? Yes    Living Will     is not on file - instructed patient to bring in a copy to scan into their chart      Health Maintenance Summary            Current Care Gaps       Diabetes: Urine Protein Screening (Yearly) Overdue since 12/19/2024      04/15/2025  Order placed for Microalbumin Creat Ratio Urine (Lab Collect) by Niurka Silva M.D.    12/19/2023  MICROALBUMIN CREAT RATIO URINE    01/11/2022  MICROALBUMIN CREAT RATIO URINE    04/29/2015  Done    04/24/2013  MICROALBUMIN CREAT RATIO URINE     Only the first 5 history entries have been loaded, but more history exists.            COVID-19 Vaccine (9 - 2024-25 season) Overdue since 3/5/2025      09/05/2024  Imm Admin: Covid-19 Mrna (Spikevax) Moderna 12+ Years    05/09/2023  Imm Admin: PFIZER BIVALENT SARS-COV-2 VACCINE (12+)    09/14/2022  Imm Admin: PFIZER BIVALENT SARS-COV-2 VACCINE (12+)    08/21/2022  Outside Immunization: COVID - Pfizer    05/11/2022  Imm Admin: PFIZER DELCID CAP SARS-COV-2 VACCINATION (12+)      Only the first 5 history entries have been loaded, but more history exists.                      Needs Review       Hepatitis C Screening  Tentatively Complete      08/01/2023  Hepatitis C Antibody component of HEP C VIRUS ANTIBODY              Fasting Lipid Profile (Yearly) Tentatively due on 11/21/2025       11/21/2024  Lipid Profile    12/19/2023  Lipid Profile    08/01/2023  Lipid Profile    01/11/2022  Lipid Profile    07/06/2020  Lipid Profile      Only the first 5 history entries have been loaded, but more history exists.              Mammogram (Yearly) Tentatively due on 12/23/2025 12/23/2024  MA-SCREENING MAMMO RIGHT W/TOMOSYNTHESIS W/CAD    12/21/2023  MA-SCREENING MAMMO RIGHT W/TOMOSYNTHESIS W/CAD    12/20/2022  MA-SCREENING MAMMO BILAT W/TOMOSYNTHESIS W/CAD    12/14/2021  MA-SCREENING MAMMO BILAT W/TOMOSYNTHESIS W/CAD    12/01/2017  [id number]      Only the first 5 history entries have been loaded, but more history exists.              Diabetes: Monofilament / LE Exam (Yearly) Tentatively due on 4/15/2026      04/15/2025  Diabetic Monofilament LE Exam    12/16/2014  Done    07/30/2013  HM Diabetes Foot Exam Done              Colorectal Cancer Screening (Colonoscopy - Every 10 Years) Tentatively due on 6/21/2027 06/21/2017  REFERRAL TO GI FOR COLONOSCOPY    06/21/2017  REFERRAL TO GI FOR COLONOSCOPY                      Upcoming       Zoster (Shingles) Vaccines (2 of 2) Postponed until 12/8/2025 09/23/2019  Outside Immunization: Shingrix    07/21/2015  Imm Admin: Varicella Vaccine Live              Diabetes: Retinopathy Screening (Yearly) Postponed until 4/15/2026      04/01/2025  RETINAL SCREENING RESULTS    05/10/2016  REFERRAL FOR RETINAL SCREENING EXAM    08/05/2015  AMB REFERRAL FOR RETINAL SCREENING EXAM    05/06/2014  AMB REFERRAL FOR RETINAL SCREENING EXAM              Influenza Vaccine (1) Next due on 9/1/2025 09/05/2024  Outside Immunization: Influenza, High Dose    09/22/2023  Imm Admin: Influenza Vaccine Adult HD    08/21/2022  Outside Immunization: Influenza, quadrivalent, split, preservative free, 3 years or older    08/20/2022  Imm Admin: Influenza Vaccine Adult HD    09/08/2021  Imm Admin: Influenza Vaccine Adult HD      Only the first 5 history entries have been  loaded, but more history exists.              A1c Screening (Every 6 Months) Next due on 9/15/2025      03/15/2025  HEMOGLOBIN A1C    11/21/2024  HEMOGLOBIN A1C    12/19/2023  HEMOGLOBIN A1C    08/01/2023  HEMOGLOBIN A1C    01/11/2022  HEMOGLOBIN A1C      Only the first 5 history entries have been loaded, but more history exists.              SERUM CREATININE (Yearly) Next due on 11/21/2025 11/21/2024  Comp Metabolic Panel    12/19/2023  Comp Metabolic Panel    08/01/2023  Comp Metabolic Panel    01/11/2022  Comp Metabolic Panel    07/05/2020  COMP METABOLIC PANEL      Only the first 5 history entries have been loaded, but more history exists.              Annual Wellness Visit (Yearly) Next due on 7/8/2026 07/08/2025  Level of Service: ND ANNUAL WELLNESS VISIT-INCLUDES PPPS SUBSEQUE*    12/21/2023  Visit Dx: Encounter for Medicare annual wellness exam    08/15/2023  Visit Dx: Encounter for Medicare annual wellness exam              Bone Density Scan (Every 5 Years) Next due on 12/21/2028 12/21/2023  DS-BONE DENSITY STUDY (DEXA)    07/22/2015  DS-BONE DENSITY STUDY (DEXA)              IMM DTaP/Tdap/Td Vaccine (3 - Td or Tdap) Next due on 3/4/2035      03/04/2025  Imm Admin: Tdap Vaccine    01/06/2015  Imm Admin: Tdap Vaccine                      Completed or No Longer Recommended       Pneumococcal Vaccine: 50+ Years (Series Information) Completed      05/28/2022  Imm Admin: Pneumococcal Conjugate Vaccine (PCV20)    09/19/2020  Imm Admin: Pneumococcal Conjugate Vaccine (Prevnar/PCV-13)    11/05/2009  Imm Admin: Pneumococcal polysaccharide vaccine (PPSV-23)              Hepatitis A Vaccine (Hep A) (Series Information) Aged Out      No completion history exists for this topic.              Hepatitis B Vaccine (Hep B) (Series Information) Aged Out     No completion history exists for this topic.              HPV Vaccines (Series Information) Aged Out     No completion history exists for this topic.       "        Polio Vaccine (Inactivated Polio) (Series Information) Aged Out     No completion history exists for this topic.              Meningococcal Immunization (Series Information) Aged Out     No completion history exists for this topic.              Meningococcal B Vaccine (Series Information) Aged Out     No completion history exists for this topic.              Cervical Cancer Screening  Discontinued        Frequency changed to Never automatically (Topic No Longer Applies)    06/03/2015  PAP IG (IMAGE GUIDED)                            Patient Care Team:  Niurka Silva M.D. as PCP - General (Internal Medicine)  Holger Gaitan M.D. (Inactive) (Ophthalmology)      Financial Resource Strain: Low Risk  (7/8/2025)    Overall Financial Resource Strain (CARDIA)     Difficulty of Paying Living Expenses: Not hard at all      Transportation Needs: No Transportation Needs (7/8/2025)    PRAPARE - Transportation     Lack of Transportation (Medical): No     Lack of Transportation (Non-Medical): No      Food Insecurity: No Food Insecurity (7/8/2025)    Hunger Vital Sign     Worried About Running Out of Food in the Last Year: Never true     Ran Out of Food in the Last Year: Never true        Encounter Vitals  Weight: 63.5 kg (140 lb)  Height: 165.1 cm (5' 5\")  BMI (Calculated): 23.3     Alert, oriented in no acute distress.  Eye contact is good, speech goal directed, affect calm.    Assessment and Plan. The following treatment and monitoring plan is recommended:  Hyperlipidemia  Chronic, ongoing. Maintains on statin therapy without issue. Most recent lipid panel from 11/2024 with . Continue atorvastatin 80mg daily. Recommend Mediterranean diet and regular physical activity. Follow up with PCP at least annually for continued monitoring and management.   Lab Results   Component Value Date/Time    CHOLSTRLTOT 211 (H) 11/21/2024 06:40 AM     (H) 11/21/2024 06:40 AM    HDL 73 11/21/2024 06:40 AM    TRIGLYCERIDE " 108 2024 06:40 AM       Primary hypertension  Chronic, stable. Pt monitors BP at home stating she runs WNL. Denies dizziness/lightheadedness, CP, dyspnea. Continue current treatment regime: enalapril 10mg daily. Follow up with PCP annually for continued monitoring and management.     Type 2 diabetes mellitus without complication, with long-term current use of insulin (HCC)  Chronic, stable, diet controlled. Last A1c 6.3 as of 3/2025. Last monofilament foot exam: 2025, last urine microalb/creat: 2025, last retinal screenin2023. Continue to advise low carbohydrate diet with regular physical activity. Continue current treatment regime. Follow up with PCP as scheduled for continued monitoring and management.    Osteopenia of lumbar spine  Chronic, stable. Last DEXA 2023 with osteopenia of the lumbar spine  with T score of -1.4. Denies hx of fragility fracture. Advise calcium and vitamin D supplementation with weightbearing exercises as tolerated. Follow up with PCP at least annually for continued monitoring and management.      Services suggested: No services needed at this time  Health Care Screening: Age-appropriate preventive services recommended by USPTF and ACIP covered by Medicare were discussed today. Services ordered if indicated and agreed upon by the patient.  Referrals offered: Community-based lifestyle interventions to reduce health risks and promote self-management and wellness, fall prevention, nutrition, physical activity, tobacco-use cessation, weight loss, and mental health services as per orders if indicated.    Discussion today about general wellness and lifestyle habits:    Prevent falls and reduce trip hazards; Cautioned about securing or removing rugs.  Have a working fire alarm and carbon monoxide detector.  Engage in regular physical activity and social activities.    Follow-up: No follow-ups on file.              [1]   Current Outpatient Medications   Medication Sig Dispense  Refill    enalapril (VASOTEC) 10 MG Tab TAKE 1 TABLET BY MOUTH EVERY DAY 90 Tablet 3    atorvastatin (LIPITOR) 80 MG tablet TAKE 1 TABLET BY MOUTH EVERY DAY IN THE EVENING 90 Tablet 3    aspirin EC (ECOTRIN) 81 MG TBEC Take 81 mg by mouth every bedtime.      glucose blood (ONETOUCH ULTRA) strip Use 1 strip by other route in the morning, at noon, and at bedtime. 100 Strip 0    Nutritional Supplements (GLUCERNA SHAKE) Liquid Take 1 Can by mouth every 48 hours. 237 mL 2    ciclopirox (PENLAC) 8 % solution Apply to left big toenail once daily at bedtime x 90 days 6 mL 3    diclofenac sodium (VOLTAREN) 1 % Gel Apply 2 g topically 4 times a day as needed (trigger joint). (Patient not taking: Reported on 4/15/2025) 100 g 0    ONETOUCH ULTRA strip USE TO TEST BLOOD SUGAR ONCE A  Strip 3    Cyanocobalamin (VITAMIN B-12 PO) Take 1 Tab by mouth every day. (Patient not taking: Reported on 4/15/2025)      therapeutic multivitamin-minerals (THERAGRAN-M) Tab Take 1 Tab by mouth every day.      Calcium Carb-Cholecalciferol (CALCIUM 600+D3 PO) Take 1 Tab by mouth every morning.       No current facility-administered medications for this visit.   [2]   Social History  Tobacco Use    Smoking status: Never    Smokeless tobacco: Never   Vaping Use    Vaping status: Never Used   Substance Use Topics    Alcohol use: No    Drug use: No   [3]   Past Surgical History:  Procedure Laterality Date    CATARACT EXTRACTION WITH IOL Bilateral 2020    MASTECTOMY  01/01/2002    BREAST IMPLANT REMOVAL      BREAST RECONSTRUCTION      DILATION AND CURETTAGE      MASTECTOMY MODIFIED RADICAL      left

## 2025-07-08 NOTE — ASSESSMENT & PLAN NOTE
Chronic, stable. Last DEXA 12/2023 with osteopenia of the lumbar spine  with T score of -1.4. Denies hx of fragility fracture. Advise calcium and vitamin D supplementation with weightbearing exercises as tolerated. Follow up with PCP at least annually for continued monitoring and management.

## 2025-07-25 DIAGNOSIS — R73.03 PREDIABETES: ICD-10-CM

## 2025-07-28 RX ORDER — BLOOD SUGAR DIAGNOSTIC
1 STRIP MISCELLANEOUS 3 TIMES DAILY
Qty: 100 STRIP | Refills: 0 | Status: SHIPPED | OUTPATIENT
Start: 2025-07-28